# Patient Record
Sex: MALE | Race: WHITE
[De-identification: names, ages, dates, MRNs, and addresses within clinical notes are randomized per-mention and may not be internally consistent; named-entity substitution may affect disease eponyms.]

---

## 2020-07-08 LAB
BUN BLD-MCNC: 25 MG/DL (ref 7–18)
GLUCOSE SERPLBLD-MCNC: 115 MG/DL (ref 74–106)
HCT VFR BLD CALC: 48.9 % (ref 39.6–49)
LYMPHOCYTES # SPEC AUTO: 1.6 K/UL (ref 0.7–4.9)
PMV BLD: 9.2 FL (ref 7.6–11.3)
POTASSIUM SERPL-SCNC: 4.3 MMOL/L (ref 3.5–5.1)
RBC # BLD: 5.78 M/UL (ref 4.33–5.43)

## 2020-07-08 NOTE — RAD REPORT
EXAM DESCRIPTION:  RAD - Chest Pa And Lat (2 Views) - 7/8/2020 3:56 pm

 

CLINICAL HISTORY:  pre-op for surgery, patient pending arm surgery

 

COMPARISON:  None

 

TECHNIQUE:  Frontal and lateral views of the chest were obtained.

 

FINDINGS:  The lungs are clear.  A few small granulomatous calcifications are present. Heart size is 
normal and central vasculature is within normal limits.  No pleural effusion or pneumothorax seen.  N
o acute bony finding noted.  No aortic abnormality.

 

IMPRESSION:  No acute cardiopulmonary process.

## 2020-07-09 ENCOUNTER — HOSPITAL ENCOUNTER (OUTPATIENT)
Dept: HOSPITAL 97 - OR | Age: 65
Discharge: HOME | End: 2020-07-09
Attending: SPECIALIST
Payer: MEDICARE

## 2020-07-09 VITALS — OXYGEN SATURATION: 97 % | DIASTOLIC BLOOD PRESSURE: 72 MMHG | SYSTOLIC BLOOD PRESSURE: 107 MMHG

## 2020-07-09 VITALS — TEMPERATURE: 97.5 F

## 2020-07-09 DIAGNOSIS — G89.18: ICD-10-CM

## 2020-07-09 DIAGNOSIS — S66.124A: ICD-10-CM
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
## 2020-07-09 DIAGNOSIS — Z01.810: ICD-10-CM

## 2020-07-09 DIAGNOSIS — Z01.812: ICD-10-CM

## 2020-07-09 DIAGNOSIS — S56.821A: Primary | ICD-10-CM

## 2020-07-09 DIAGNOSIS — S66.122A: ICD-10-CM

## 2020-07-09 PROCEDURE — 80048 BASIC METABOLIC PNL TOTAL CA: CPT

## 2020-07-09 PROCEDURE — 25260 REPAIR FOREARM TENDON/MUSCLE: CPT

## 2020-07-09 PROCEDURE — 93005 ELECTROCARDIOGRAM TRACING: CPT

## 2020-07-09 PROCEDURE — 14020 TIS TRNFR S/A/L 10 SQ CM/<: CPT

## 2020-07-09 PROCEDURE — 85025 COMPLETE CBC W/AUTO DIFF WBC: CPT

## 2020-07-09 PROCEDURE — 0LQ70ZZ REPAIR RIGHT HAND TENDON, OPEN APPROACH: ICD-10-PCS

## 2020-07-09 PROCEDURE — 0JXD0ZZ TRANSFER RIGHT UPPER ARM SUBCUTANEOUS TISSUE AND FASCIA, OPEN APPROACH: ICD-10-PCS

## 2020-07-09 PROCEDURE — 26350 REPAIR FINGER/HAND TENDON: CPT

## 2020-07-09 PROCEDURE — 71046 X-RAY EXAM CHEST 2 VIEWS: CPT

## 2020-07-09 PROCEDURE — 82947 ASSAY GLUCOSE BLOOD QUANT: CPT

## 2020-07-09 PROCEDURE — 0LQ50ZZ REPAIR RIGHT LOWER ARM AND WRIST TENDON, OPEN APPROACH: ICD-10-PCS

## 2020-07-09 PROCEDURE — 36415 COLL VENOUS BLD VENIPUNCTURE: CPT

## 2020-07-09 NOTE — XMS REPORT
Continuity of Care Document

                             Created on:2020



Patient:MILA MERCADO

Sex:Male

:1955

External Reference #:[id number]





Demographics







                          Address                   [address] [place] [id number]

 

                          Home Phone                [phone]

 

                          Phone                     [phone]

 

                          Preferred Language        en-US

 

                          Marital Status            Unknown

 

                          [place] Affiliation     Unknown

 

                          Race                      Unknown

 

                          Ethnic Group              Unknown









Author







                          Organization              [employer]

 [employer]









Care Team Providers







                    Name                Role                Phone

 

                    [employer] Unavailable         Un

available









Problems







          Problem   Status    Onset     Classification Date      Comments  Sourc

e



                              Date                Reported            



                                                                      



                                                                      



                                                                      

 

          Atherosclerotic           03/10/2             2018            

[facility]



          heart disease of           018                                     [place]

ar



          native coronary                                                   [place]

,



          artery without                                                   [place]

[place]



          angina pectoris                                                   



                                                                      

 

          Diabetes mellitus Resolved     Problem   2020           M

H Medical



          (disorder)           018                                     Group,



                                                                      [facility] [place]



                                                                      [place],[facility]



                                                                      



                                                                      

 

          CATHLAB   Active                                     



                              018                                     [facility]



                                                                      



                                                                      



                                                                      

 

          CAD S/P   Active                                     



          PERCUTANEOUS           018                                     [facility]

st



          CORONARY ANTIOPLAST                                                   



                                                                      



                                                                      

 

          I25.10    Active                                     



                              018                                     [facility]



                                                                      



                                                                      



                                                                      

 

          Dyspnea,            2018            [facility]



          unspecified           018                                     [place]

nd



                                                                      



                                                                      



                                                                      

 

          UNSPECIFIED CHEST Active                                     

 [place]



          PAIN-786.50 /           015                                     [place]



          GASTROES                                                    



                                                                      



                                                                      

 

          UNSPECIFIED CHEST Active       Condition 2015           [facility]



          PAIN                015                                     Group



                                                                      



                                                                      



                                                                      

 

          SCREENING FOR COLON Active       Condition 2015          

  Medical



          CANCER              015                                     Group



                                                                      



                                                                      



                                                                      

 

          Colon neoplasm Active       Problem   2015 Data       S

ugar



          screening           015                           migrated  [place]



          (procedure)                                         from GE   



                                                            [facility] 



                                                            on 8/22/15. 



                                                                      



                                                                      

 

          COLONIC POLYPS, HX Active       Condition 2015           

 Medical



          OF                  015                                     Group



                                                                      



                                                                      



                                                                      

 

          BELCHING  Active       Condition 2015            Medica

l



                              015                                     Group



                                                                      



                                                                      



                                                                      

 

          Belching symptom Active       Problem   2020 Data      

 Medical



          (context-dependent           015                           migrated  G

roup,



          category)                                         from GE   [facility] [place]



                                                            [facility] [place],



                                                            on 8/22/15. VIVIANA Weinberg [place]



                                                                      [place]



                                                                      

 

          History of polyp of Active       Problem   2020 Data     

  Medical



          colon (situation)           015                           migrated  Gr

oup,



                                                            from GE   [facility] [place]



                                                            [facility],



                                                            on 8/22/15. VIVIANA Weinberg [place]



                                                                      [place]



                                                                      

 

          BODY MASS INDEX Active       Condition 2015            

Medical



          28.0-28.9, ADULT           014                                     [facility]

up



                                                                      



                                                                      



                                                                      

 

          ACTINIC KERATOSIS Active       Condition 2015           M

 Medical



                              014                                     Group



                                                                      



                                                                      



                                                                      

 

          PVD       Active       Condition 2015            Medica

l



                              014                                     Group



                                                                      



                                                                      



                                                                      

 

          DISEASE,  Active       Condition 2015            Med[facility]

l



          HYPERTENSIVE HEART,           014                                     

Group



          BENIGN, W/O HF                                                   



                                                                      



                                                                      

 

          RENAL DISEASE, Active       Condition 2015           [facility]

edical



          CHRONIC, MILD           014                                     Group



                                                                      



                                                                      



                                                                      

 

          DIABETES MELLITUS Active       Condition 2015           M

 Medical



          WITH RENAL           014                                     Group



          MANIFESTATIONS,                                                   



          TYPE II OR                                                   



          UNSPECIFIED TYPE,                                                   



          NOT STATED AS                                                   



          UNCONTROLLED                                                   



                                                                      

 

          SEBORRHEIC Active       Condition 2015            Medic

al



          KERATOSIS           014                                     Group



                                                                      



                                                                      



                                                                      

 

          Actinic keratosis Active       Problem   2020 Data      M

H Medical



          (disorder)           014                           migrated  Group,



                                                            from GE   [facility] [place]



                                                            [facility] [place],



                                                            on 5/30/15. Francoise

t,M



                                                                      H [place]



                                                                      [place]



                                                                      

 

          Chronic kidney Active       Problem   2020 Data      [facility]

edical



          disease stage 2           014                           migrated  Grou

p,



          (disorder)                                         from GE   [facility] [place]

r



                                                            [facility] [place],



                                                            on 5/30/15. Francoise

t,M



                                                                      H [place]



                                                                      [place]



                                                                      

 

          Hypertensive heart Active       Problem   2020 Data      

 Medical



          disease without           014                           migrated  Grou

p,[facility]



          congestive heart                                         from GE   OPI

D [place]



          failure (disorder)                                         [facility] 

[place],



                                                            on 5/30/15. Francoise

t,M



                                                                      H [place]



                                                                      [place]



                                                                      

 

          Peripheral vascular Active       Problem   2020 Data     

  Medical



          disease (disorder)           014                           migrated  G

roup,



                                                            from GE   [facility] [place]



                                                            [facility] [place],



                                                            on 5/30/15. Francoise

t,M



                                                                      H [place]



                                                                      [place]



                                                                      

 

          Diabetes mellitus Active       Problem   2020 Data      M

H Medical



          type 2 (disorder)           014                           migrated  Gr

oup,



                                                            from GE   [facility] [place]



                                                            [facility] [place],



                                                            on 5/30/15. Francoise amezquita,M



                                                                      H [place]



                                                                      [place]



                                                                      

 

          Senile    Active       Problem   2015 Data       [place]



          hyperkeratosis           014                           migrated  [place]



          (disorder)                                         from GE   



                                                            [facility] 



                                                            on 5/30/15. 



                                                                      



                                                                      

 

          CAD/POSITIVE STRESS Active                                     





                              014                                     [place]



                                                                      



                                                                      



                                                                      

 

          SHOULDER PAIN Active       Condition 2015            Me

dical



                              014                                     Group



                                                                      



                                                                      



                                                                      

 

          Shoulder pain Active       Problem   2020 Data       Me

dical



          (finding)           014                           migrated  Group,



                                                            from    [employer] [place]



                                                            [facility] [place],



                                                            on 5/30/15. Francoise amezquita,M



                                                                      H [place]



                                                                      [place]



                                                                      

 

          LONG-TERM (CURRENT) Active       Condition 2015          

  Medical



          USE OF OTHER           013                                     Group



          MEDICATIONS                                                   



                                                                      



                                                                      

 

          OTITIS EXTERNA Inactive     Condition 2015            M

edical



                              013                                     Group



                                                                      



                                                                      



                                                                      

 

          Long-term drug Active       Problem   2015 Data       S

ugar



          therapy (procedure)           013                           migrated  

[place]



                                                            from [employer]   



                                                            [facility] 



                                                            on 5/30/15. 



                                                                      



                                                                      

 

          Otitis externa Resolved     Problem   2015 Data       S

ugar



          (disorder)           013                           migrated  [place]



                                                            from [facility] 



                                                            on 7/18/15. 



                                                                      



                                                                      

 

          PSORIASIS Active       Condition 2015            Medica

l



                              013                                     Group



                                                                      



                                                                      



                                                                      

 

          Psoriasis Active       Problem   2020 Data       Medica

l



          (disorder)           013                           migrated  Group,[facility]



                                                            from GE   [facility] [place]



                                                            [facility] [place],



                                                            on 5/30/15. Francoise amezquita,M



                                                                      H [place]



                                                                      [place]



                                                                      

 

          BACK PAIN Active       Condition 2015            Medica

l



                              013                                     Group



                                                                      



                                                                      



                                                                      

 

          Backache (finding) Resolved     Problem   2020 Data      

 Medical



                              013                           migrated  Group,



                                                            from [employer] [place]



                                                            [facility] [place],



                                                            on 7/18/15. Francoise amezquita,M



                                                                      H [place]



                                                                      [place]



                                                                      

 

          CHEST PAIN Inactive     Condition 2015            Medic

al



                              013                                     Group



                                                                      



                                                                      



                                                                      

 

          SHOULDER  Active       Condition 2015            Medica

l



          IMPINGEMENT           013                                     Group



                                                                      



                                                                      



                                                                      

 

          Atypical chest pain Active       Problem   2020 Data     

  Medical



          (finding)           013                           migrated  Group,



                                                            from GE   [facility] [place]



                                                            [facility] [place],



                                                            on 5/30/15. Francoise amezquita,M



                                                                      H [place]



                                                                      [place]



                                                                      

 

          Impingement Active       Problem   2020 Data       Medi

s[place]



          syndrome of           013                           migrated  Group,



          shoulder region                                         from GE   [facility]

 [place]



          (disorder)                                         [facility] [place],



                                                            on 5/30/15. [place]

t,M



                                                                      H [place]



                                                                      [place]



                                                                      

 

          OBESITY   Active       Condition 2015            Medica

l



                              012                                     Group



                                                                      



                                                                      



                                                                      

 

          GERD      Active       Condition 2015            Medica

l



                              012                                     Group



                                                                      



                                                                      



                                                                      

 

          HEALTH SCREENING Inactive     Condition 2015           

 Medical



                              012                                     Group



                                                                      



                                                                      



                                                                      

 

          Gastroesophageal Active       Problem   2020 Data      

 Medical



          reflux disease           012                           migrated  Group

,



          (disorder)                                         from GE   [facility] [place]

r



                                                            [facility] [place],



                                                            on 5/30/15. VIVIANA Weinberg [place]



                                                                      [place]



                                                                      

 

          Obesity (disorder) Active       Problem   2020 Data      

 Medical



                              012                           migrated  Group,



                                                            from GE   [facility] [place]



                                                            [facility] [place],



                                                            on 5/30/15. VIVIANA Weinberg [place]



                                                                      [place]



                                                                      

 

          DEPENDENT EDEMA Inactive  10/01/2   Condition 2015            

Medical



                              012                                     Group



                                                                      



                                                                      



                                                                      

 

          CORONARY  Active       Condition 2015            [facility]

l



          ATHEROSCLEROSI           001                                     Group



                                                                      



                                                                      



                                                                      

 

          HYPERURICEMIA Active       Condition 2015           [facility]

dical



                              960                                     Group



                                                                      



                                                                      



                                                                      

 

          GOUT      Active              Condition 10/13/2014            [facility]

l



                                                                      Group



                                                                      



                                                                      

 

          HYPERTENSION Inactive            Condition 2015            Med

ical



                                                                      Group



                                                                      



                                                                      

 

          HYPERLIPIDEMIA Active              Condition 2015           [facility]

edical



                                                                      Group



                                                                      



                                                                      

 

          DIABETES MELLITUS Inactive            Condition 2015           [facility]



                                                                      Group



                                                                      



                                                                      

 

          COPD      Active              Condition 2015            [facility]

l



                                                                      Group



                                                                      



                                                                      

 

          OSTEOARTHRITIS Active              Condition 2015           [facility]

edical



                                                                      Group



                                                                      



                                                                      

 

          FH LUNG CANCER Inactive            Condition 2015           [facility]

edical



                                                                      Group



                                                                      



                                                                      

 

          FH DIABETES - DM Inactive            Condition 2015           

 Medical



                                                                      Group



                                                                      



                                                                      

 

          FH HEART DISEASE Inactive            Condition 2015           

 Medical



                                                                      Group



                                                                      



                                                                      

 

          FH STROKE Inactive            Condition 2015            [facility]

l



                                                                      Group



                                                                      



                                                                      

 

          DIABETES MELLITUS, Active              Condition 2015           

 Medical



          TYPE II, CONTROLLED                                                   

Group



          W/VASCULAR                                                   



          COMPLICATIONS                                                   



                                                                      

 

          Malignant tumor of Resolved            Problem   2020           

[facility]



          urinary bladder                                                   Grou

p,



          (disorder)                                                   [facility] [place]

r



                                                                      [place],



                                                                      [place],

[place]



                                                                      [place]



                                                                      [place]



                                                                      

 

          Chronic obstructive Active              Problem   2020 Data     

  Medical



          lung disease                                         migrated  Group,M

H



          (disorder)                                         from GE   [facility] [place]

r



                                                            [facility] [place],



                                                            on 5/30/15. VIVIANA Weinberg [place]



                                                                      [place]



                                                                      

 

          Atherosclerosis of Active              Problem   2020 Data      

 Medical



          coronary artery                                         migrated  Grou

p,[facility]



          (disorder)                                         from GE   [facility] [place]

r



                                                            [facility] [place],



                                                            on 5/30/15. [place]

t,[place]



                                                                      [place]



                                                                      [place]



                                                                      

 

          Neuropathy Active              Problem   2020 feet due   Medic

al



          (disorder)                                         diabetic  Group,



                                                                      [facility] [place]



                                                                      [place],[facility],

M



                                                                      [facility] [place]



                                                                      [place]



                                                                      

 

          Numbness of foot Active              Problem   2020           

 Medical



          (finding)                                                   Group,



                                                                      [facility] [place]



                                                                      [place],[facility],

M



                                                                      [facility] [place]



                                                                      [place]



                                                                      

 

          Peripheral Active              Problem   2020 Data       Medic

al



          circulatory                                         migrated  Group,



          disorder associated                                         from GE   

[facility] [place]



          with type II                                         [facility] [place]

H



          diabetes mellitus                                         on 5/30/15. 

[facility],



          (disorder)                                                   [facility] [place]



                                                                      [place]



                                                                      

 

          Bronchitis Resolved            Problem   2020            Medic

al



          (disorder)                                                   Group,



                                                                      [facility] [place]



                                                                      [place],[facility]



                                                                      



                                                                      

 

          Chest pain Active              Problem   2020            Medic

al



          (finding)                                                   Group,



                                                                      [facility] [place]



                                                                      [place],[facility]



                                                                      



                                                                      

 

          GOUT(Confirmed) Resolved            Problem   2020            

Medical



                                                                      Group,



                                                                      [facility] [place]



                                                                      [place],[facility]



                                                                      



                                                                      

 

          Essential Resolved            Problem   2020            [facility]

l



          hypertension                                                   Group,

H



          (disorder)                                                   [facility] [place]

r



                                                                      [place],[facility],

M



                                                                      [facility] [place]



                                                                      [place]



                                                                      

 

          Hyperuricemia Active              Problem   2020            Me

dical



          (disorder)                                                   Group,



                                                                      [facility] [place]



                                                                      [place],[facility],

M



                                                                      [facility] [place]



                                                                      [place]



                                                                      

 

          Mixed     Active              Problem   2020            Med[facility]

l



          hyperlipidemia                                                   Group

,



          (disorder)                                                   [facility] [place]

r



                                                                      [place],[facility]



                                                                      



                                                                      

 

          Myocardial Resolved            Problem   2020            Medic

al



          infarction                                                   Group,



          (disorder)                                                   [facility] [place]

r



                                                                      [place],[facility]



                                                                      



                                                                      

 

          Pneumonia Resolved            Problem   2020            [facility]

l



          (disorder)                                                   Group,



                                                                      [facility] [place]



                                                                      [place],[facility]



                                                                      



                                                                      

 

          Pulmonary emphysema Active              Problem   2020          

  Medical



          (disorder)                                                   Group,



                                                                      [facility] [place]



                                                                      [place],[facility]



                                                                      



                                                                      

 

          Essential (primary)                               2018          

  [facility]



          hypertension                                                   [place]

and



                                                                      



                                                                      

 

          Centrilobular                               2018            OP

ID



          emphysema                                                   [place]



                                                                      



                                                                      

 

          Non-ST elevation                               2018           



          (NSTEMI) myocardial                                                   

[place]



          infarction                                                   



                                                                      

 

          Type 2 diabetes                               2018           



          mellitus with                                                   [place]

est



          diabetic chronic                                                   



          kidney disease                                                   



                                                                      

 

          Type 2 diabetes                               2018           



          mellitus with                                                   [place]

est



          diabetic                                                    



          polyneuropathy                                                   



                                                                      

 

          Type 2 diabetes                               2018           



          mellitus with                                                   [place]

est



          hyperglycemia                                                   



                                                                      

 

          Acute                                   2018           



          posthemorrhagic                                                   [place]

[place]



          anemia                                                      



                                                                      

 

          Other                                   2018           



          postprocedural                                                   [facility]



          cardiac functional                                                   



          disturbances                                                   



          following cardiac                                                   



          surgery                                                     



                                                                      

 

          Other specified                               2018           



          complication of                                                   [facility]

[place]



          cardiac prosthetic                                                   



          devices, implants                                                   



          and grafts, initial                                                   



          encounter                                                   



                                                                      

 

          Obesity,                                2018           



          unspecified                                                   [place]

t



                                                                      



                                                                      

 

          Chronic kidney                               2018           



          disease, stage 2                                                   [facility]



          (mild)                                                      



                                                                      

 

          Gastro-esophageal                               2018           M

H



          reflux disease                                                   [facility]



          without esophagitis                                                   



                                                                      



                                                                      

 

          Body mass index                               2018           [facility]



          (BMI) 29.0-29.9,                                                   [place]

[place]



          adult                                                       



                                                                      

 

          Hypertensive heart                               2018           

[facility]



          and chronic kidney                                                   [place]

[place]



          disease without                                                   



          heart failure, with                                                   



          stage 1 through                                                   



          stage 4 chronic                                                   



          kidney disease, or                                                   



          unspecified chronic                                                   



          kidney disease                                                   



                                                                      

 

          Personal history of                               2018          

 



          malignant neoplasm                                                   [place]

[place]



          of bladder                                                   



                                                                      

 

          Personal history of                               2018          

 



          nicotine dependence                                                   

[facility]



                                                                      



                                                                      

 

          Personal history of                               2018          

 



          colonic polyps                                                   [place]

[place]



                                                                      



                                                                      

 

          Long term (current)                               2018          

 



          use of insulin                                                   [place]

[place]



                                                                      



                                                                      

 

          Hyperlipidemia,                               2018           



          unspecified                                                   [place]

t



                                                                      



                                                                      

 

          Chronic obstructive                               2018          

 



          pulmonary disease,                                                   [place]

[place]



          unspecified                                                   



                                                                      

 

          Gout, unspecified                               2018           M

H



                                                                      [facility]



                                                                      



                                                                      

 

          Psoriasis,                               2018           



          unspecified                                                   [place]

t



                                                                      



                                                                      

 

          acid      Active              Problem   2014           



          reflux(Confirmed)                                                   [place]

[place]



                                                                      



                                                                      

 

          diabetes  Active              Problem   2014           



          mellitus(Confirmed)                                                   

[facility]



                                                                      



                                                                      

 

          Coronary  Resolved            Problem   2015 Pt had    



          arteriosclerosis                                         bypass    [facility],



          (disorder)                                         surgery   [facility] [place]



                                                                      [place]



                                                                      

 

          Coronary artery Active              Problem   2015           



          disease(Confirmed)                                                   [place]

[place]



                                                                      [facility] [place]



                                                                      [place]



                                                                      

 

          DM (diabetes Resolved            Problem   2015           



          mellitus)(Confirmed                                                   

[facility],



          )                                                           [facility] [place]



                                                                      [place]



                                                                      

 

          GOUT(Confirmed) Active              Problem   2015           [facility],





                                                                      [place]



                                                                      [place]



                                                                      

 

           Gout (disorder) Resolved              Problem    2015 Data migr

ated from [employer] on

7/8/15.                                  [place]



                                                            Data migrated from G

E [employer] on 6/2/15. [place]



                                                                      



                                                                      

 

          GOUT(Confirmed) Resolved            Problem   2015           [facility],





                                                                      [place]



                                                                      [place]



                                                                      

 

          Hyperlipidemia Active              Problem   2015 Data       S

[place]



          (disorder)                                         [place]



                                                            from [facility]   



                                                            [facility] 



                                                            on 5/30/15. 



                                                                      



                                                                      

 

          Hypertensive Active              Problem   2015           



          disorder, systemic                                                   [place]

[place]



          arterial (disorder)                                                   

[facility] [place]



                                                                      [place]



                                                                      

 

          Osteoarthritis Active              Problem   2015 Data       [place]

[place]



          (disorder)                                         [place]  [place]



                                                            from [facility] 



                                                            on 5/30/15. 



                                                                      



                                                                      

 

          Osteoarthritis(Conf Resolved            Problem   2015          

 



          irmed)                                                      [facility],

[place]



                                                                       [place]



                                                                      [place]



                                                                      

 

          Dyspnea (finding) Active              Problem   2020           M

[facility]



                                                                      Group,[facility],

M



                                                                      [place]



                                                                      [place]



                                                                      

 

          CHEST PAIN NOS Active                                             [place]

[place]



                                                                      [place]



                                                                      

 

          ATHSCL HEART Active                                            



          DISEASE OF NATIVE                                                   [place]

[facility]



          [facility]ON[facility]                                                    



                                                                      







Medications







        Medication Details Route   Status  Patient Ordering Order   Source



                                        Instructions Provider Date    



                                                                



                                                                



                                                                

 

        Vitamin B-12 1000 1,000           Active                     Med

ical



        mcg oral tablet microgram = 1                                 2020    Gr

oup



                tab, PO,                                         



                Daily, 0                                         



                Refill(s)                                         



                                                                



                                                                

 

        Vitamin D3 PO, Daily, 0         Active                     Medic

al



                Refill(s)                                     Group



                                                                



                                                                



                                                                

 

        Insulin Glargine 20 unit,         Active                     Med

ical



        100 UNT/ML SUB-Q,                                      Group



        Injectable Bedtime, # 15                                         



        Solution mL, 3                                           



                Refill(s),                                         



                other                                           



                                                                



                                                                

 

        sitagliptin 100 MG = 1 tab, PO,         Active                    

 Medical



        Oral Tablet Daily, # 90                                 2020    Group



        [Januvia] tab, 1                                          



                Refill(s),                                         



                Pharmacy:                                         



                [facility]                                         



                                                                



                                                                

 

        spironolactone 25 = 1 tab, PO,         Active                  /  M

[facility]



        mg oral tablet BID, # 180                                 2020    Group



                tab, 1                                          



                Refill(s),                                         



                Pharmacy:                                         



                [facility]                                         



                [facility]                                         



                                                                



                                                                

 

        canagliflozin 300 = 1 tab, PO,         Active                  /  M

[facility]



        MG Oral Tablet Daily, # 90                                 2020    Group



        [Invokana] tab, 1                                          



                Refill(s),                                         



                Pharmacy:                                         



                [facility]                                         



                                                                



                                                                

 

        Fenofibrate 160 MG = 1 tab, PO,         Active                    

 Medical



        Oral Tablet Daily, # 90                                 2020    Group



                tab, 1                                          



                Refill(s),                                         



                Pharmacy:                                         



                [facility]                                         



                                                                



                                                                

 

        glimepiride 2 mg See             Active                     Medi

[place]



        oral tablet Instructions,                                     Group



                TAKE 1 TABLET                                         



                BY MOUTH TWO                                         



                TIMES DAILY ,                                         



                IN THE                                          



                MORNING AND                                         



                AT NOON, #                                         



                180 tab, 1                                         



                Refill(s),                                         



                Pharmacy:                                         



                [facility]                                         



                                                                



                                                                

 

        losartan 100 mg = 1 tab, PO,         Active                     

Medical



        oral tablet Daily, # 90                                 2020    Group



                tab, 1                                          



                Refill(s),                                         



                Pharmacy:                                         



                [facility]                                         



                                                                



                                                                

 

        Metformin = 1 tab, PO,         Active                   Medica

l



        hydrochloride 1000 BID, # 180                                 2020    Gr

oup



        MG Oral Tablet tab, 1                                          



                Refill(s),                                         



                Pharmacy:                                         



                [facility]                                         



                                                                



                                                                

 

        Metoprolol = 1 tab, PO,         Active                   Medic

al



        Tartrate 25 mg Daily, # 90                                 2020    Group



        oral tablet tab, 1                                          



                Refill(s),                                         



                Pharmacy:                                         



                [facility]                                         



                                                                



                                                                

 

        amLODIPine 5 mg = 1 tab, PO,         Active                   

Medical



        oral tablet Daily, # 90                                 2020    Group



                tab, 1                                          



                Refill(s),                                         



                Pharmacy:                                         



                [facility]                                         



                                                                



                                                                

 

        atorvastatin 40 mg = 1 tab, PO,         Active                   Medical



        oral tablet BID, # 180                                 2020    Group



                tab, 1                                          



                Refill(s),                                         



                Pharmacy:                                         



                [facility]                                         



                                                                



                                                                

 

        azithromycin 500 500 mg = 1         Active                   M

edical



        mg oral tablet tab, PO,                                 2019    Group



                Daily, X 7                                         



                day, # 7 tab,                                         



                0 Refill(s),                                         



                Pharmacy:                                         



                [facility]                                         



                [facility]                                         



                                                                



                                                                

 

        Medrol 4 mg oral = 1 pkt, PO,         Active                    

 Medical



        tablet  ONCE, as                                 2019    Group



                directed on                                         



                package                                         



                labeling, #                                         



                21 tab, 0                                         



                Refill(s),                                         



                Pharmacy:                                         



                [facility]                                         



                [facility]                                         



                                                                



                                                                

 

        Brompheniramine 5 mL, PO,         Active                     Med

ical



        Maleate 0.4 MG/ML TID, PRN                                 2019    Group



        / Dextromethorphan cough, X 8                                         



        Hydrobromide 2 day, # 120                                         



        MG/ML / mL, 0                                           



        Pseudoephedrine Refill(s),                                         



        Hydrochloride 6 Pharmacy:                                         



        MG/ML Oral [facility]                                         



        [facility]                                         



        DM]                                                     



                                                                

 

        canagliflozin 300 = 1 tab, PO,         Active                  /  M

[facility]



        MG Oral Tablet Daily, # 90                                 2019    Group



        [Invokana] tab, 1                                          



                Refill(s),                                         



                Pharmacy:                                         



                [facility]                                         



                                                                



                                                                

 

        Famotidine 20 MG 20 mg = 1         Active                   Me

dical



        Oral Tablet tab, PO, BID,                                 2019    Group



                # 180 tab, 1                                         



                Refill(s),                                         



                Pharmacy:                                         



                [facility]                                         



                                                                



                                                                

 

        Ascorbic Acid 200 1 tab, PO,         Active                   

Medical



        MG / Beta Carotene Daily, # 30                                 2019    G

roup



        1000 UNT / cuprous tab, 0                                          



        oxide 2 MG / Refill(s)                                         



        dl-alpha                                                 



        tocopheryl acetate                                                 



        60 UNT / Lutein 2                                                 



        MG / sodium                                                 



        selenate 0.055 MG                                                 



        / Zinc Oxide 40 MG                                                 



        Oral Tablet                                                 



        [Ocuvite]                                                 



                                                                

 

        icosapent ethyl 2 gm = 2 cap,         Active                  

 Medical



        1000 MG Oral PO, BID, #                                 2019    Group



        Capsule [Vascepa] 360 cap, 1                                         



                Refill(s),                                         



                Pharmacy:                                         



                [facility]                                         



                                                                



                                                                

 

        Insulin Glargine 50 unit,         Active                   Med

ical



        100 UNT/ML SUB-Q,                                  2019    Group



        Injectable Bedtime, # 15                                         



        Solution mL, 3                                           



                Refill(s),                                         



                Pharmacy:                                         



                [facility]                                         



                                                                



                                                                

 

        [id number] See             Active                   Me

dical



        UF_SHORT Instructions,                                 2019    Group



                # 90 ea,                                         



                Refill(s) 3,                                         



                USE ONE PEN                                         



                NEEDLE DAILY,                                         



                Pharmacy:                                         



                [facility]                                         



                                                                



                                                                

 

        LANCET ONE TOUCH See             Active                     Medi

sandra



        DELICA 33 G Instructions,                                 2019    Group



                # 100 ea,                                         



                Refill(s) 3,                                         



                TEST 1 TIME                                         



                DAILY,                                          



                Pharmacy:                                         



                [facility]                                         



                                                                



                                                                

 

        canagliflozin 300 = 1 tab, PO,         Active                  

H Medical



        MG Oral Tablet Daily, # 90                                 2019    Group



        [Invokana] tab,                                            



                Refill(s) 1,                                         



                Pharmacy:                                         



                [facility]                                         



                                                                



                                                                

 

        T STRIP S ONE See             Active                     Medical



        TOUCH ULTRA BLUE Instructions,                                 2019    G

roup



                # 100 strip,                                         



                Refill(s) 3,                                         



                USE 1 STRIP                                         



                DAILY,                                          



                Pharmacy:                                         



                [facility]                                         



                                                                



                                                                

 

        spironolactone 25 See             Active                     Med

ical



        mg oral tablet Instructions,                                 2019    [facility]

up



                TAKE ONE                                         



                TABLET BY                                         



                MOUTH TWICE                                         



                DAILY, # 180                                         



                tab, 1                                          



                Refill(s),                                         



                Pharmacy:                                         



                [facility]                                         



                                                                



                                                                

 

        sitagliptin 100  mg = 1         Active                    

 Medical



        Oral Tablet tab, PO,                                 2019    Group



        [Januvia] Daily, # 90                                         



                tab, 1                                          



                Refill(s),                                         



                Pharmacy:                                         



                [facility]                                         



                                                                



                                                                

 

        Ranitidine 150 MG See             Active                     Med

ical



        Oral Tablet Instructions,                                 2019    Group



                TAKE ONE                                         



                TABLET BY                                         



                MOUTH TWICE                                         



                DAILY, # 180                                         



                tab, 1                                          



                Refill(s),                                         



                Pharmacy:                                         



                [facility]                                         



                                                                



                                                                

 

        omeprazole 20 mg 20 mg = 1         Active                     Me

dical



        oral delayed cap, PO,                                 2019    Group



        release capsule Daily, # 90                                         



                cap, 1                                          



                Refill(s),                                         



                Pharmacy:                                         



                [facility]                                         



                                                                



                                                                

 

        metoprolol 25 mg = 1         Active                   Medical



        tartrate 25 mg tab, PO,                                 2019    Group



        oral tablet Daily, # 90                                         



                tab, 1                                          



                Refill(s),                                         



                Pharmacy:                                         



                [facility]                                         



                [facility]                                         



                                                                



                                                                

 

        Metformin See             Active                   Medical



        hydrochloride 1000 Instructions,                                 2019   

 Group



        MG Oral Tablet TAKE ONE                                         



                TABLET BY                                         



                MOUTH TWICE                                         



                DAILY, # 180                                         



                tab, 1                                          



                Refill(s),                                         



                Pharmacy:                                         



                [facility]                                         



                [facility]                                         



                                                                



                                                                

 

        Losartan Potassium 100 mg = 1         Active                  

 Medical



        100 MG Oral Tablet tab, PO,                                 2019    [facility]

p



        [Cozaar] Daily, # 90                                         



                tab, 1                                          



                Refill(s),                                         



                Pharmacy:                                         



                [facility]                                         



                [facility]                                         



                                                                



                                                                

 

        Insulin Glargine 40 unit,         Active                   Med

ical



        100 UNT/ML SUB-Q,                                  2019    Group



        Injectable Bedtime, # 3                                         



        Solution mL, 3                                           



                Refill(s),                                         



                Pharmacy:                                         



                [facility]                                         



                [facility]                                         



                                                                



                                                                

 

        glimepiride 2 mg 2 mg = 1 tab,         Active                  

[facility]



        oral tablet PO, BID, am                                 2019    Group



                and noon, #                                         



                180 tab, 1                                         



                Refill(s),                                         



                Pharmacy:                                         



                [facility]                                         



                [facility]                                         



                                                                



                                                                

 

        Fenofibrate 160 MG See             Active                   Me

dical



        Oral Tablet Instructions,                                 2019    Group



                TAKE ONE                                         



                TABLET BY                                         



                MOUTH ONCE                                         



                DAILY, # 90                                         



                tab, 1                                          



                Refill(s),                                         



                Pharmacy:                                         



                [facility]                                         



                [facility]                                         



                                                                



                                                                

 

        clopidogrel 75 mg 75 mg = 1         Active                   M

edical



        oral tablet tab, PO,                                 2019    Group



                Daily, # 90                                         



                tab, 3                                          



                Refill(s),                                         



                Pharmacy:                                         



                [facility]                                         



                [facility]                                         



                                                                



                                                                

 

        atorvastatin 40 mg 40 mg = 1         Active                   

Medical



        oral tablet tab, PO, BID,                                 2019    Group



                # 180 tab, 1                                         



                Refill(s),                                         



                Pharmacy:                                         



                [facility]                                         



                                                                



                                                                

 

        Amlodipine 5 MG 5 mg = 1 tab,         Active                    

 Medical



        Oral Tablet PO, Daily, #                                 2019    Group



        [Norvasc] 90 tab, 1                                         



                Refill(s),                                         



                Pharmacy:                                         



                [facility]                                         



                                                                



                                                                

 

        allopurinol 300 mg See             Active                    [facility]

dical



        oral tablet Instructions,                                 2019    Group



                TAKE ONE                                         



                TABLET BY                                         



                MOUTH ONCE                                         



                DAILY, # 90                                         



                tab, 3                                          



                Refill(s),                                         



                Pharmacy:                                         



                [facility]                                         



                [facility]                                         



                                                                



                                                                

 

        canagliflozin 100 100 mg = 1         Inactive                 

 Medical



        MG Oral Tablet tab, PO,                                 2019    Group



        [Invokana] Before                                          



                Breakfast, #                                         



                90 tab, 1                                         



                Refill(s),                                         



                Pharmacy:                                         



                [facility]                                         



                [facility]                                         



                                                                



                                                                

 

        canagliflozin 100 100 mg = 1         Inactive                 

 Medical



        MG Oral Tablet tab, PO,                                 2019    Group



        [Invokana] Before                                          



                Breakfast, #                                         



                30 tab, 0                                         



                Refill(s)                                         



                                                                



                                                                

 

        Anoro Ellipta 62.5 1 puff,         Active                   Me

dical



        mcg-25 mcg INHALER,                                 2019    Group



        inhalation powder Daily, # 1                                         



                ea, 11                                          



                Refill(s),                                         



                Pharmacy:                                         



                [facility]                                         



                [facility]                                         



                                                                



                                                                

 

        sitagliptin 100  mg = 1         No Longer                 01/15/  

[facility]



        Oral Tablet tab, PO,         Active                      Group



        [Januvia] Daily, # 90                                         



                tab, 1                                          



                Refill(s),                                         



                Pharmacy:                                         



                [facility]                                         



                [facility]                                         



                                                                



                                                                

 

        spironolactone 25 See             No Longer                 01/15/  [facility] 

[facility]



        mg oral tablet Instructions,         Active                      [facility]

up



                TAKE ONE                                         



                TABLET BY                                         



                MOUTH TWICE                                         



                DAILY, # 180                                         



                tab, 1                                          



                Refill(s),                                         



                Pharmacy:                                         



                [facility]                                         



                [facility]                                         



                                                                



                                                                

 

        Ranitidine 150 MG See             No Longer                 01/15/  [facility] 

[facility]



        Oral Tablet Instructions,         Active                      Group



                TAKE ONE                                         



                TABLET BY                                         



                MOUTH TWICE                                         



                DAILY, # 180                                         



                tab, 1                                          



                Refill(s),                                         



                Pharmacy:                                         



                [facility]                                         



                                                                



                                                                

 

        omeprazole 20 mg 20 mg = 1         No Longer                 01/15/  [facility] 

[facility]



        oral delayed cap, PO,         Active                  2019    Group



        release capsule Daily, # 90                                         



                cap, 1                                          



                Refill(s),                                         



                Pharmacy:                                         



                [facility]                                         



                [facility]                                         



                                                                



                                                                

 

        metoprolol 25 mg = 1         No Longer                 01/15/  [facility]

l



        tartrate 25 mg tab, PO,         Active                  2019    Group



        oral tablet Daily, # 90                                         



                tab, 1                                          



                Refill(s),                                         



                Pharmacy:                                         



                [facility]                                         



                [facility]                                         



                                                                



                                                                

 

        Metformin See             No Longer                 01/15/  [facility]



        hydrochloride 1000 Instructions,         Active                  2019   

 Group



        MG Oral Tablet TAKE ONE                                         



                TABLET BY                                         



                MOUTH TWICE                                         



                DAILY, # 180                                         



                tab, 1                                          



                Refill(s),                                         



                Pharmacy:                                         



                [facility]                                         



                                                                



                                                                

 

        Losartan Potassium 100 mg = 1         No Longer                 01/15/  

[facility]



        100 MG Oral Tablet tab, PO,         Active                  2019    [facility]

p



        [Cozaar] Daily, # 90                                         



                tab, 1                                          



                Refill(s),                                         



                Pharmacy:                                         



                [facility]                                         



                                                                



                                                                

 

        glimepiride 2 mg 2 mg = 1 tab,         No Longer                 01/15/ 

 [facility]



        oral tablet PO, BID, am         Active                  2019    Group



                and noon, #                                         



                180 tab, 1                                         



                Refill(s),                                         



                Pharmacy:                                         



                [facility]                                         



                                                                



                                                                

 

        Fenofibrate 160 MG See             No Longer                 01/15/  [facility] 

[facility]



        Oral Tablet Instructions,         Active                  2019    Group



                TAKE ONE                                         



                TABLET BY                                         



                MOUTH ONCE                                         



                DAILY, # 90                                         



                tab, 1                                          



                Refill(s),                                         



                Pharmacy:                                         



                [facility]                                         



                                                                



                                                                

 

        atorvastatin 40 mg 40 mg = 1         No Longer                 01/15/  [facility]

[facility]



        oral tablet tab, PO, BID,         Active                  2019    Group



                # 180 tab, 1                                         



                Refill(s),                                         



                Pharmacy:                                         



                [facility]                                         



                                                                



                                                                

 

        Amlodipine 5 MG 5 mg = 1 tab,         No Longer                 01/15/  

[facility]



        Oral Tablet PO, Daily, #         Active                  2019    Group



        [Norvasc] 90 tab, 1                                         



                Refill(s),                                         



                Pharmacy:                                         



                [facility]                                         



                                                                



                                                                

 

        allopurinol 300 mg See             No Longer                 01/15/  [facility] 

[facility]



        oral tablet Instructions,         Active                  2019    Group



                TAKE ONE                                         



                TABLET BY                                         



                MOUTH ONCE                                         



                DAILY, # 90                                         



                tab, 3                                          



                Refill(s),                                         



                Pharmacy:                                         



                [facility]                                         



                                                                



                                                                

 

        Linagliptin 5 MG 5 mg = 1 tab,         Inactive                 01/15/  

[facility]



        Oral Tablet PO, Daily, #                                 2019    Group



        [Tradjenta] 90 tab, 1                                         



                Refill(s),                                         



                Pharmacy:                                         



                [facility]                                         



                                                                



                                                                

 

        Insulin Glargine 40 unit,         No Longer                 01/15/  [facility]

[facility]



        100 UNT/ML SUB-Q,          Active                  2019    Group



        Injectable Bedtime, # 3                                         



        Solution mL, 3                                           



                Refill(s),                                         



                Pharmacy:                                         



                [facility]                                         



                [facility]                                         



                                                                



                                                                

 

        BD Ultra-Fine Ginger 1  MISC,         Active                  01/15/  [facility]

[facility]



        Insulin Pen Daily, # 100                                 2019    Group



        [place] 32G ea, 3                                           



        4mm=5/32 inch Refill(s),                                         



                Pharmacy:                                         



                [facility]                                         



                [facility]                                         



                                                                



                                                                

 

        OneTouch Ultra2 1 ea, MISC,         Active                   M

edical



        Blood Glucose ONCE, Use as                                 2019    Group



        Meter   directed to                                         



                check blood                                         



                sugar.                                          



                E11.9, # 1                                         



                ea, Insulin                                         



                dependent,                                         



                Does not use                                         



                insulin pump,                                         



                Last DM eval                                         



                date                                            



                18, 0                                         



                Refill(s)                                         



                                                                



                                                                

 

        Aspirin 81  mg = 2         Active                  11/15/  [facility]

[facility]



        Enteric Coated tab, PO,                                 2018    Group



        Tablet  Daily, # 60                                         



                tab, 4                                          



                Refill(s),                                         



                other                                           



                                                                



                                                                

 

        clopidogrel 75 mg 75 mg = 1         Active                  11/15/   [facility]

[facility]



        oral tablet tab, PO,                                 2018    Group



                Daily, # 90                                         



                tab, 3                                          



                Refill(s),                                         



                Pharmacy:                                         



                [facility]                                         



                [facility]                                         



                                                                



                                                                

 

        predniSONE 20 mg 40 mg = 2         No Longer                 09/10/  [facility] 

[facility]



        oral tablet tab, PO,         Active                  2018    Group



                Daily, X 5                                         



                day, # 10                                         



                tab, 0                                          



                Refill(s),                                         



                Pharmacy:                                         



                [facility]                                         



                [facility]                                         



                                                                



                                                                

 

        Amoxicillin 875  mg = 1         No Longer                 09/10/  

 Medical



        / Clavulanate 125 tab, PO,         Active                  2018    Group



        MG Oral Tablet Q12H, X 7                                         



        [Augmentin 875-mg] day, # 14                                         



                tab, 0                                          



                Refill(s),                                         



                Pharmacy:                                         



                [facility]                                         



                [facility]                                         



                                                                



                                                                

 

        ticagrelor 90 mg 90 mg = 1         No Longer                  

Medical



        oral tablet tab, PO,         Active                  2018    Group



                Q12H, # 180                                         



                tab, 4                                          



                Refill(s),                                         



                Pharmacy:                                         



                [facility]                                         



                [facility]                                         



                                                                



                                                                

 

        spironolactone 25 See             No Longer                   [facility]

edical



        mg oral tablet Instructions,         Active                      [facility]

up



                TAKE ONE                                         



                TABLET BY                                         



                MOUTH TWICE                                         



                DAILY, # 180                                         



                tab, 1                                          



                Refill(s),                                         



                Pharmacy:                                         



                [facility]                                         



                [facility]                                         



                                                                



                                                                

 

        sitagliptin 100 MG See             No Longer                    

Medical



        Oral Tablet Instructions,         Active                      Group



        [Januvia] TAKE ONE                                         



                TABLET BY                                         



                MOUTH ONCE                                         



                DAILY, # 90                                         



                tab, 1                                          



                Refill(s),                                         



                Pharmacy:                                         



                [facility]                                         



                [facility]                                         



                                                                



                                                                

 

        Ranitidine 150 MG See             No Longer                   [facility]

edical



        Oral Tablet Instructions,         Active                      Group



                TAKE ONE                                         



                TABLET BY                                         



                MOUTH TWICE                                         



                DAILY, # 180                                         



                tab, 1                                          



                Refill(s),                                         



                Pharmacy:                                         



                [facility]                                         



                [facility]                                         



                                                                



                                                                

 

        omeprazole 20 mg 20 mg = 1         No Longer                    

Medical



        oral delayed cap, PO,         Active                      Group



        release capsule Daily, # 90                                         



                cap, 1                                          



                Refill(s),                                         



                Pharmacy:                                         



                [facility]                                         



                [facility]                                         



                                                                



                                                                

 

        60 ACTUAT 2 puff,         Active                     Medical



        olodaterol 0.0025 INHALATION,                                 2018    Gr

oup



        MG/ACTUAT / Q24H, # 1                                         



        tiotropium 0.0025 box, 6                                          



        MG/ACTUAT Metered Refill(s),                                         



        Dose Inhaler Pharmacy:                                         



        [[facility]] [facility]                                         



                [facility]                                         



                                                                



                                                                

 

        metoprolol 25 mg = 1         No Longer                  [facility]

l



        tartrate 25 mg tab, PO,         Active                  2018    Group



        oral tablet Daily, # 90                                         



                tab, 1                                          



                Refill(s),                                         



                Pharmacy:                                         



                [facility]                                         



                [facility]                                         



                                                                



                                                                

 

        Metformin See             No Longer                  Medical



        hydrochloride 1000 Instructions,         Active                  2018   

 Group



        MG Oral Tablet TAKE ONE                                         



                TABLET BY                                         



                MOUTH TWICE                                         



                DAILY, # 180                                         



                tab, 1                                          



                Refill(s),                                         



                Pharmacy:                                         



                [facility]                                         



                [facility]                                         



                                                                



                                                                

 

        Losartan Potassium 100 mg = 1         No Longer                  Medical



        100 MG Oral Tablet tab, PO,         Active                  2018    [facility]

p



        [Cozaar] Daily, # 90                                         



                tab, 1                                          



                Refill(s),                                         



                Pharmacy:                                         



                [facility]                                         



                [facility]                                         



                                                                



                                                                

 

        3 ML Insulin 40 unit,         No Longer                  Medic

al



        Glargine 100 SUB-Q,          Active                  2018    Group



        UNT/ML Prefilled Bedtime, # 15                                         



        Syringe [Lantus] mL, 3                                           



                Refill(s),                                         



                Pharmacy:                                         



                [facility]                                         



                [facility]                                         



                                                                



                                                                

 

        glimepiride 2 mg 2 mg = 1 tab,         No Longer                  Medical



        oral tablet PO, BID, am         Active                  2018    Group



                and noon, #                                         



                180 tab, 1                                         



                Refill(s),                                         



                Pharmacy:                                         



                [facility]                                         



                [facility]                                         



                                                                



                                                                

 

        Fenofibrate 160 MG See             No Longer                  

Medical



        Oral Tablet Instructions,         Active                  2018    Group



                TAKE ONE                                         



                TABLET BY                                         



                MOUTH ONCE                                         



                DAILY, # 90                                         



                tab, 1                                          



                Refill(s),                                         



                Pharmacy:                                         



                [facility]                                         



                [facility]                                         



                                                                



                                                                

 

        canagliflozin 300 300 mg = 1         No Longer                 08/30/  M

H Medical



        MG Oral Tablet tab, PO,         Active                  2018    Group



        [Invokana] Daily, # 90                                         



                tab, 1                                          



                Refill(s),                                         



                Pharmacy:                                         



                [facility]                                         



                [facility]                                         



                                                                



                                                                

 

        atorvastatin 40 mg 40 mg = 1         No Longer                 08/30/  M

H Medical



        oral tablet tab, PO, BID,         Active                  2018    Group



                # 180 tab, 1                                         



                Refill(s),                                         



                Pharmacy:                                         



                [facility]                                         



                                                                



                                                                

 

        Amlodipine 5 MG 5 mg = 1 tab,         No Longer                  Medical



        Oral Tablet PO, Daily, #         Active                  2018    Group



        [Norvasc] 90 tab, 1                                         



                Refill(s),                                         



                Pharmacy:                                         



                [facility]                                         



                                                                



                                                                

 

        allopurinol 300 mg See             No Longer                  

Medical



        oral tablet Instructions,         Active                  2018    Group



                TAKE ONE                                         



                TABLET BY                                         



                MOUTH ONCE                                         



                DAILY, # 90                                         



                tab, 3                                          



                Refill(s),                                         



                Pharmacy:                                         



                [facility]                                         



                                                                



                                                                

 

        cefdinir 300  mg = 1         No Longer                  

Medical



        Oral Capsule cap, PO,         Active                  2018    Group



                Q12H, X 10                                         



                day, # 20                                         



                cap, 0                                          



                Refill(s),                                         



                Pharmacy:                                         



                [facility]                                         



                [facility]                                         



                                                                



                                                                

 

        BD Ultra-Fine 1 , MISC,         Active                   Med

ical



        Short Insulin Pen Daily, # 100                                 2018    G

roup



        Needles 31G ea, 3                                           



        8mm=5/16 inch Refill(s)                                         



                                                                



                                                                

 

        OneTouch Ultra2 1 ea, MISC,         No Longer                 

 Medical



        Blood Glucose ONCE, Use as         Active                  2018    Group



        Meter   directed to                                         



                check blood                                         



                sugar.                                          



                E11.9, # 1                                         



                ea, Insulin                                         



                dependent,                                         



                Does not use                                         



                insulin pump,                                         



                Last DM eval                                         



                date                                            



                18, 0                                         



                Refill(s)                                         



                                                                



                                                                

 

        Unilet Comfort 1 ea, MISC,         Active                   Me

dical



        Touch 30G Super Daily, E11.2018    Gr

oup



        Thin Lancets # 100 ea,                                         



                Insulin                                         



                dependent,                                         



                Does not use                                         



                insulin pump,                                         



                Last DM eval                                         



                date                                            



                18, 3                                         



                Refill(s)                                         



                                                                



                                                                

 

        OneTouch Ultra 1 ea, MISC,         Active                   Me

dical



        Blue Blood Glucose Daily, E11.2018   

 Group



        Test Strip # 100 ea,                                         



                Insulin                                         



                dependent,                                         



                Does not use                                         



                insulin pump,                                         



                Last DM eval                                         



                date                                            



                18, 3                                         



                Refill(s)                                         



                                                                



                                                                

 

        3 ML Insulin 30 unit,         No Longer                  Medic

al



        Glargine 100 SUB-Q,          Active                  2018    Group



        UNT/ML Prefilled Bedtime, # 15                                         



        Syringe [Lantus] mL, 3                                           



                Refill(s),                                         



                Pharmacy:                                         



                [facility]                                         



                                                                



                                                                

 

        sitagliptin 100 MG See             No Longer                  

Medical



        Oral Tablet Instructions,         Active                  2018    Group



        [Januvia] TAKE ONE                                         



                TABLET BY                                         



                MOUTH ONCE                                         



                DAILY, # 90                                         



                tab, 1                                          



                Refill(s),                                         



                Pharmacy:                                         



                [facility]                                         



                                                                



                                                                

 

        canagliflozin 300 300 mg = 1         No Longer                 /  

H Medical



        MG Oral Tablet tab, PO,         Active                  2018    Group



        [Invokana] Daily, # 90                                         



                tab, 1                                          



                Refill(s),                                         



                Pharmacy:                                         



                [facility]                                         



                                                                



                                                                

 

        Fenofibrate 160 MG See             No Longer                  

Medical



        Oral Tablet Instructions,         Active                  2018    Group



                TAKE ONE                                         



                TABLET BY                                         



                MOUTH ONCE                                         



                DAILY, # 90                                         



                tab, 1                                          



                Refill(s),                                         



                Pharmacy:                                         



                [facility]                                         



                                                                



                                                                

 

        Amlodipine 5 MG 5 mg = 1 tab,         No Longer                   

 Medical



        Oral Tablet PO, Daily, #         Active                  2018    Group



        [Norvasc] 90 tab, 1                                         



                Refill(s),                                         



                Pharmacy:                                         



                [facility]                                         



                [facility]                                         



                                                                



                                                                

 

        Insulin Glargine 30 unit,         Inactive                    Me

dical



        100 UNT/ML SUB-Q, Daily,                                 2018    Group



        Injectable # 15 mL, 1                                         



        Solution Refill(s),                                         



                Pharmacy:                                         



                [facility]                                         



                [facility]                                         



                                                                



                                                                

 

        Insulin Glargine 46 unit,         Active                     Med

ical



        100 UNT/ML SUB-Q, Q12H,                                 2018    Group



        Injectable # 15 mL, 1                                         



        Solution Refill(s),                                         



                Pharmacy:                                         



                [facility]                                         



                [facility]                                         



                                                                



                                                                

 

        Nitroglycerin 0.4 0.4 mg = 1         Active                     

Medical



        MG Sublingual tab, SL,                                     Group



        Tablet  Q5Min, PRN                                         



                Chest Pain,                                         



                not to exceed                                         



                3 doses/15                                         



                min--if pain                                         



                persists,                                         



                seek medical                                         



                attention, #                                         



                25 tab, 3                                         



                Refill(s),                                         



                Pharmacy:                                         



                [facility]                                         



                [facility]                                         



                                                                



                                                                

 

        Nitroglycerin 0.4 0.4 mg = 1         Inactive                   

 Medical



        MG Sublingual tab, SL,                                     Group



        Tablet  Q5Min, PRN                                         



                Chest Pain,                                         



                Give up to 3                                         



                doses. Call                                         



                911 if pain                                         



                persists.                                         



                                                                



                                                                

 

        metoprolol 25 mg = 1         Active                     Medical



        tartrate 25 mg tab, PO,                                 2018    Group



        oral tablet Daily, # 90                                         



                tab, 1                                          



                Refill(s),                                         



                Pharmacy:                                         



                [facility]                                         



                [facility]                                         



                                                                



                                                                

 

        atorvastatin 40 mg 40 mg = 1         Active                     

Medical



        oral tablet tab, PO, BID,                                 2018    Group



                # 180 tab, 1                                         



                Refill(s),                                         



                Pharmacy:                                         



                [facility]                                         



                [facility]                                         



                                                                



                                                                

 

        spironolactone 25 See             Active                     Med

ical



        mg oral tablet Instructions,                                 2018    [facility]

up



                TAKE ONE                                         



                TABLET BY                                         



                MOUTH TWICE                                         



                DAILY, # 180                                         



                tab, 1                                          



                Refill(s),                                         



                Pharmacy:                                         



                [facility]                                         



                [facility]                                         



                                                                



                                                                

 

        Losartan Potassium 100 mg = 1         Active                    

 Medical



        100 MG Oral Tablet tab, PO,                                 2018    [facility]

p



        [Cozaar] Daily, # 90                                         



                tab, 1                                          



                Refill(s),                                         



                Pharmacy:                                         



                [facility]                                         



                [facility]                                         



                                                                



                                                                

 

        Fenofibrate 160 MG See             Active                  03/ Me

dical



        Oral Tablet Instructions,                                 2018    Group



                TAKE ONE                                         



                TABLET BY                                         



                MOUTH ONCE                                         



                DAILY, # 90                                         



                tab, 1                                          



                Refill(s),                                         



                Pharmacy:                                         



                [facility]                                         



                [facility]                                         



                                                                



                                                                

 

        canagliflozin 300 300 mg = 1         Active                     

Medical



        MG Oral Tablet tab, PO,                                 2018    [facility]



        [Invokana] Daily, # 90                                         



                tab, 1                                          



                Refill(s),                                         



                Pharmacy:                                         



                [facility]                                         



                [facility]                                         



                                                                



                                                                

 

        atorvastatin 40 mg See             No Longer                    

Medical



        oral tablet Instructions,         Active                      Group



                TAKE ONE                                         



                TABLET BY                                         



                MOUTH ONCE                                         



                DAILY, # 90                                         



                tab, 1                                          



                Refill(s),                                         



                Pharmacy:                                         



                [facility]                                         



                [facility]                                         



                                                                



                                                                

 

        allopurinol 300 mg See             Active                    [facility]

dical



        oral tablet Instructions,                                 2018    Group



                TAKE ONE                                         



                TABLET BY                                         



                MOUTH ONCE                                         



                DAILY, # 90                                         



                tab, 3                                          



                Refill(s),                                         



                Pharmacy:                                         



                [facility]                                         



                [facility]                                         



                                                                



                                                                

 

        glimepiride 2 mg 2 mg = 1 tab,         No Longer                  

  Medical



        oral tablet PO, BID, am         Active                      Group



                and noon, X                                         



                90 day, # 180                                         



                tab, 1                                          



                Refill(s),                                         



                Pharmacy:                                         



                [facility]                                         



                [facility]                                         



                                                                



                                                                

 

        ticagrelor 90 mg 90 mg = 1         Active                   Me

dical



        oral tablet tab, PO,                                 2018    Group



                Q12H, # 180                                         



                tab, 4                                          



                Refill(s),                                         



                Pharmacy:                                         



                [facility]                                         



                [facility]                                         



                                                                



                                                                

 

        insulin glargine Notes: (Same         No Longer                 



                as: Lantus)         Active                      [facility]



                Do not hold                                         



                insulin                                         



                without                                         



                contacting                                         



                prescriber                                         



                WASTE: F/P -                                         



                Black; E -                                         



                Municipal                                         



                Trash Bin                                         



                "single                                         



                patient use                                         



                only"                                           



                                                                



                                                                

 

        Insulin Glargine 46 unit,         No Longer                 



        100 UNT/ML SUB-Q, Daily,         Active                      [facility]



        Injectable 0 Refill(s)                                         



        Solution                                                 



                                                                

 

        Amlodipine 5 MG 5 mg = 1 tab,         Active                  



        Oral Tablet PO, Daily, #                                 2018    [facility]



        [Norvasc] 90 tab, 0                                         



                Refill(s),                                         



                Pharmacy:                                         



                [facility]                                         



                [facility]                                         



                                                                



                                                                

 

        metoprolol 12.5 mg = 0.5         No Longer                   



        tartrate 25 mg tab, PO, Q8H,         Active                      [facility]



        oral tablet # 90 tab, 0                                         



                Refill(s),                                         



                Pharmacy:                                         



                [facility]                                         



                [facility]                                         



                                                                



                                                                

 

        ticagrelor 90 mg 90 mg = 1         No Longer                 



        oral tablet tab, PO,         Active                      [facility]



                Q12H, # 60                                         



                tab, 0                                          



                Refill(s),                                         



                Pharmacy:                                         



                [facility]                                         



                [facility]                                         



                                                                



                                                                

 

        Insulin Glargine 46 unit,         Active                  



        100 UNT/ML SUB-Q, Q12H,                                     [facility]

t



        Injectable # 15 mL, 0                                         



        Solution Refill(s),                                         



                Pharmacy:                                         



                [facility]                                         



                [facility]                                         



                                                                



                                                                

 

        Insulin Lispro Notes: (Same         Inactive                 



                as: Humalog )                                     [facility]



                Roll in palms                                         



                of hands                                         



                gently;  Do                                         



                not shake                                         



                `vigorously.                                         



                "Single                                         



                Patient Use                                         



                Only "                                          



                WASTE: F/P -                                         



                Black; E -                                         



                Municipal                                         



                Trash Bin                                         



                Stable for 28                                         



                days at room                                         



                temperature.                                         



                Expires in                                         



                _____ days                                         



                from                                            



                _____________                                         



                _Date                                           



                                                                



                                                                

 

        Glucagon 1 mg, Route:         Inactive                 



                IM, Drug                                     [facility]



                form:                                           



                PDR/INJ, PRN,                                         



                Dosing Weight                                         



                95.6, kg, PRN                                         



                Blood Glucose                                         



                Results,                                         



                Start date:                                         



                18                                         



                10:09:00 CST,                                         



                Duration: 30                                         



                day, Stop                                         



                date:                                           



                18                                         



                11:08:00 CDT                                         



                                                                



                                                                

 

        Dextrose 50% 25 gm, 50 mL,         Inactive                 



        Syringe Route: IVP,                                     [facility]



                Drug Form:                                         



                INJ, Dosing                                         



                Weight 95.6,                                         



                kg, PRN, PRN                                         



                Blood Glucose                                         



                Results,                                         



                Start date:                                         



                18                                         



                10:09:00 CST,                                         



                Duration: 30                                         



                day, Stop                                         



                date:                                           



                18                                         



                11:08:00 CDT                                         



                                                                



                                                                

 

        Ticagrelor Notes: (Same         Inactive                 



                as: Brilinta)                                     [facility]



                                                                



                                                                



                                                                

 

        Dextrose 50% 12.5 gm, 25         No Longer                   



        Syringe mL, Route:         Active                      [facility]



                IVP, Drug                                         



                Form: INJ,                                         



                Dosing Weight                                         



                95.6, kg,                                         



                PRN, PRN                                         



                Blood Glucose                                         



                Results,                                         



                Start date:                                         



                18                                         



                23:29:00 CST,                                         



                Duration: 30                                         



                day, Stop                                         



                date:                                           



                18                                         



                0:28:00 CDT                                         



                                                                



                                                                

 

        Glucagon 1 mg, Route:         No Longer                   



                IM, Drug         Active                      [facility]



                form:                                           



                PDR/INJ, PRN,                                         



                Dosing Weight                                         



                95.6, kg, PRN                                         



                Blood Glucose                                         



                Results,                                         



                Start date:                                         



                18                                         



                23:29:00 CST,                                         



                Duration: 30                                         



                day, Stop                                         



                date:                                           



                18                                         



                0:28:00 CDT                                         



                                                                



                                                                

 

        Insulin Lispro Notes: (Same         No Longer                 



                as: Humalog )         Active                  2018    [facility]



                Roll in palms                                         



                of hands                                         



                gently;  Do                                         



                not shake                                         



                `vigorously.                                         



                "Single                                         



                Patient Use                                         



                Only "                                          



                WASTE: F/P -                                         



                Black; E -                                         



                Municipal                                         



                Trash Bin                                         



                Stable for 28                                         



                days at room                                         



                temperature.                                         



                Expires in                                         



                _____ days                                         



                from                                            



                _____________                                         



                _Date                                           



                                                                



                                                                

 

        Insulin Glargine Notes: (Same         Inactive                   

H



        100 UNT/ML as: Lantus)                                     [facility]



        Injectable Do not hold                                         



        Solution [Lantus] insulin                                         



                without                                         



                contacting                                         



                prescriber                                         



                WASTE: F/P -                                         



                Black; E -                                         



                Municipal                                         



                Trash Bin                                         



                "single                                         



                patient use                                         



                only"                                           



                                                                



                                                                

 

        insulin glargine Notes: (Same         No Longer                 



                as: Lantus)         Active                      [facility]



                Do not hold                                         



                insulin                                         



                without                                         



                contacting                                         



                prescriber                                         



                WASTE: F/P -                                         



                Black; E -                                         



                Municipal                                         



                Trash Bin                                         



                "single                                         



                patient use                                         



                only"                                           



                                                                



                                                                

 

        Insulin Glargine Route: SUB-Q,         Inactive                   





        100 UNT/ML Q12H, Dosing                                     [facility]

t



        Injectable Weight 95.6,                                         



        Solution [Lantus] kg, Priority:                                         



                NOW, Start                                         



                date:                                           



                18                                         



                10:23:00 CST,                                         



                Duration: 30                                         



                day, Stop                                         



                date:                                           



                18                                         



                9:00:00 CDT                                         



                                                                



                                                                

 

        Flonase 0.05 Notes: (Same         No Longer                 



        mg/inh nasal spray as: Flonase)         Active                      

[facility]



                                                                



                                                                



                                                                

 

        Fluticasone Notes: (Same         Inactive                 



        propionate 0.05 as: Flonase)                                     [facility]

[facility]



        MG/ACTUAT Metered                                                 



        Dose Nasal Spray                                                 



        [Flonase]                                                 



                                                                

 

        metoprolol Notes: (Same         No Longer                 



        tartrate as:             Active                  2018    [facility]



                Lopressor)                                         



                                                                



                                                                

 

        Famotidine Notes: (Same         No Longer                 



                as: Pepcid)         Active                      [facility]



                                                                



                                                                



                                                                

 

        Maalox Advanced Notes:          No Longer                 



        Regular Strength (aluminum         Active                      [facility]



        SUSP    hydroxide-mag                                         



                nesium                                          



                hyd-simethico                                         



                ne                                              



                [id number]/                                         



                5ml 30 ml ud                                         



                [facility])                                            



                                                                

 

        Simethicone Notes: (Same         No Longer                 



                as: Mylicon)         Active                      [facility]



                                                                



                                                                



                                                                

 

        metoprolol Notes: (Same         Inactive                 



        tartrate as:                                         [facility]



                Lopressor)                                         



                                                                



                                                                

 

        Glimepiride 2mg Glimepiride         No Longer                 



        (home med) 2mg (home         Active                      [facility]



                med), 2 tab,                                         



                Drug form:                                         



                MISC, Route:                                         



                PO, Daily,                                         



                18                                         



                17:00:00 CST,                                         



                Duration: 30                                         



                day, Stop                                         



                date:                                           



                18                                         



                9:00:00 CDT                                         



                                                                



                                                                

 

        Stiolto Respimat Stiolto         No Longer                 



        2.5 mcg-2.5 mcg Respimat 2.5         Active                      [facility]

[facility]



        inhalation aerosol mcg-2.5 mcg                                         



                inhalation                                         



                aerosol, 2                                         



                puff, Route:                                         



                INHALATION,                                         



                Daily,                                          



                18                                         



                17:00:00 CST,                                         



                Duration: 30                                         



                day, Stop                                         



                date:                                           



                18                                         



                17:00:00 CDT,                                         



                Patient's Own                                         



                Meds                                            



                                                                

 

        Protonix Notes: Tablet         No Longer                 



                should not be         Active                      [facility]



                chewed or                                         



                crushed.                                         



                (Same as:                                         



                Protonix)                                         



                                                                



                                                                

 

        heparin Notes:          No Longer                 



                porcine         Active                      [facility]



                heparin                                         



                                                                



                                                                

 

        Bisoprolol Notes: (Same         Inactive                 



                As: Zebeta)                                     [facility]



                                                                



                                                                



                                                                

 

        insulin glargine Notes: (Same         Inactive                 /  

H



                as: Lantus)                                     [facility]



                Do not hold                                         



                insulin                                         



                without                                         



                contacting                                         



                prescriber                                         



                WASTE: F/P -                                         



                Black; E -                                         



                Municipal                                         



                Trash Bin                                         



                "single                                         



                patient use                                         



                only"                                           



                                                                



                                                                

 

        Azelastine spray Azelastine         No Longer                 



        0.1%    spray 0.1%, 2         Active                      [facility]



                sprays, Drug                                         



                form: MISC,                                         



                Route: NASAL,                                         



                Daily, PRN                                         



                Allergic                                         



                reaction,                                         



                18                                         



                10:51:00 CST,                                         



                Duration: 30                                         



                day, Stop                                         



                date:                                           



                18                                         



                10:50:00 CDT                                         



                                                                



                                                                

 

        Insulin regular   60 units)         No Longer                 



        100 unit + Sodium WASTE: F/P -         Active                      [facility]

[facility]



        Chloride 0.9% IV Black; E -                                         



        99 mL   Municipal                                         



                Trash Bin                                         



                Stable for 28                                         



                days at room                                         



                temperature                                         



                Expires in                                         



                ______ days                                         



                from                                            



                _____________                                         



                _Date                                           



                                                                



                                                                

 

        Insulin Glargine 10 unit,         Inactive                 



        100 UNT/ML Route: SUB-Q,                                 2018    [facility]

st



        Injectable ONCE, Dosing                                         



        Solution [Lantus] Weight 95.6,                                         



                kg, Priority:                                         



                NOW, Start                                         



                date:                                           



                18                                         



                9:17:00 CST,                                         



                Stop date:                                         



                18                                         



                9:17:00 CST                                         



                                                                



                                                                

 

        Fluticasone Notes: (Same         No Longer                 



        propionate 0.05 as: Flonase)         Active                      [facility]

[facility]



        MG/ACTUAT Metered                                                 



        Dose Nasal Spray                                                 



        [Flonase]                                                 



                                                                

 

        Nasacort Allergy Nasacort         No Longer                 



        24HR nasal spray Allergy 24HR         Active                      [facility]



                nasal spray,                                         



                2 spray,                                         



                Route: NASAL,                                         



                Daily,                                          



                18                                         



                9:00:00 CST,                                         



                Duration: 30                                         



                day, Stop                                         



                date:                                           



                18                                         



                9:00:00 CDT,                                         



                Patient's Own                                         



                Meds                                            



                                                                

 

        Aspirin 81 MG Notes: Do not         No Longer                 



        Enteric Coated crush or         Active                      [facility]

t



        Tablet  chew. (Same                                         



                As: Ecotrin)                                         



                                                                



                                                                

 

        Ocuvite Notes: (Same         No Longer                   



        PreserVision as:Thera-M,         Active                      [facility]

st



                Theragran-M)                                         



                WASTE: F/P -                                         



                Black; E -                                         



                [employer]                                         



                Trash Bin                                         



                Give with                                         



                food.                                           



                                                                



                                                                

 

        Cozaar  Notes: (Same         No Longer                 



                as: Cozaar)         Active                      [facility]



                                                                



                                                                



                                                                

 

        Glyburide 2 mg, Route:         Inactive                 



                PO, BID,                                     [facility]



                Dosing Weight                                         



                95.455, kg,                                         



                Start date:                                         



                18                                         



                9:00:00 CST,                                         



                Duration: 30                                         



                day, Stop                                         



                date:                                           



                18                                         



                17:00:00 CDT                                         



                                                                



                                                                

 

        Allopurinol Notes: (Same         No Longer                 



                as: Zyloprim)         Active                      [facility]



                                                                



                                                                



                                                                

 

        Fenofibrate 160 MG Notes:          Inactive                 



        Oral Tablet Non-Formulary                                 2018    [facility]

est



                Drug  (Same                                         



                as: Tricor)                                         



                                                                



                                                                

 

        Januvia Notes: (Same         No Longer                 



                as: Januvia)         Active                  2018    [facility]



                pharmacy                                         



                re-entry for                                         



                product                                         



                selection                                         



                                                                



                                                                

 

        Spironolactone Notes: (Same         No Longer                 



                As:             Active                  2018    [facility]



                Aldactone)                                         



                                                                



                                                                

 

        Invokana 300 mg Invokana 300         No Longer                 

H



        oral tablet mg oral         Active                      [facility]



                tablet, 300                                         



                mg, Route:                                         



                PO, Daily,                                         



                18                                         



                9:00:00 CST,                                         



                Duration: 30                                         



                day, Stop                                         



                date:                                           



                18                                         



                9:00:00 CDT,                                         



                Patient's Own                                         



                Meds                                            



                                                                

 

        Azelastine 274             Inactive                 



        hydrochloride microgram, 2                                     [facility]



        0.137 MG/ACTUAT spray, Route:                                         



        Metered Dose Nasal NASAL, Drug                                         



        Spray   Form: SPRY,                                         



                Dosing Weight                                         



                95.455, kg,                                         



                BID, Start                                         



                date:                                           



                18                                         



                9:00:00 CST,                                         



                Duration: 30                                         



                day, Stop                                         



                date:                                           



                18                                         



                17:00:00 CDT                                         



                                                                



                                                                

 

        atorvastatin Notes: (Same         No Longer                 



                as: Lipitor)         Active                      [facility]



                                                                



                                                                



                                                                

 

        Januvia 100 mg Januvia 100         No Longer                 



        oral tablet mg oral         Active                      [facility]



                tablet, See                                         



                Instructions,                                         



                Route: PO,                                         



                Daily,                                          



                18                                         



                9:00:00 CST,                                         



                Duration: 30                                         



                day, Stop                                         



                date:                                           



                18                                         



                9:00:00 CDT,                                         



                Patient's Own                                         



                Meds                                            



                                                                

 

        fenofibrate Notes: (Same         No Longer                 



                as: Tricor)         Active                      [facility]



                                                                



                                                                



                                                                

 

        Ranitidine 150 MG 1 tab, Route:         No Longer                 



        Oral Tablet PO, Drug         Active                      [facility]



                form: TAB,                                         



                BID, Dosing                                         



                Weight                                          



                95.455, kg,                                         



                Start date:                                         



                18                                         



                9:00:00 CST,                                         



                Duration: 30                                         



                day, Stop                                         



                date:                                           



                18                                         



                17:00:00 CDT                                         



                                                                



                                                                

 

        Omeprazole 20 mg, Route:         No Longer                 



                PO, Drug         Active                      [facility]



                form: DRC,                                         



                Daily, Dosing                                         



                Weight                                          



                95.455, kg,                                         



                Start date:                                         



                18                                         



                9:00:00 CST,                                         



                Duration: 30                                         



                day, Stop                                         



                date:                                           



                18                                         



                9:00:00 CDT                                         



                                                                



                                                                

 

        Brilinta Notes: (Same         No Longer                 



                as: Brilinta)         Active                      [facility]



                                                                



                                                                



                                                                

 

        glucagon 1 mg, Route:         No Longer                 



                INJ, Drug         Active                      [facility]



                form:                                           



                PDR/INJ, PRN,                                         



                PRN Blood                                         



                Glucose                                         



                Results,                                         



                Start date:                                         



                18                                         



                1:48:00 CST,                                         



                Duration: 30                                         



                day, Stop                                         



                date:                                           



                18                                         



                2:47:00 CDT                                         



                                                                



                                                                

 

        Dextrose 50% in 50 mL, Route:         No Longer                 



        Water IV IVP, Start         Active                      [facility]



                date:                                           



                18                                         



                1:48:00 CST,                                         



                Duration: 30                                         



                day, Stop                                         



                date:                                           



                18                                         



                2:47:00 CDT,                                         



                PRN Blood                                         



                Glucose                                         



                Results                                         



                                                                



                                                                

 

        Humalog Notes: (Same         Inactive                 



                as: Humalog )                                     [place]



                Roll in palms                                         



                of hands                                         



                gently;  Do                                         



                not shake                                         



                `vigorously.                                         



                "Single                                         



                Patient Use                                         



                Only "                                          



                WASTE: F/P -                                         



                Black; E -                                         



                Municipal                                         



                Trash Bin                                         



                Stable for 28                                         



                days at room                                         



                temperature.                                         



                Expires in                                         



                _____ days                                         



                from                                            



                _____________                                         



                _Date                                           



                                                                



                                                                

 

        Humalog Notes: (Same         Inactive                 



                as: Humalog )                                     [place]



                Roll in palms                                         



                of hands                                         



                gently;  Do                                         



                not shake                                         



                `vigorously.                                         



                "Single                                         



                Patient Use                                         



                Only "                                          



                WASTE: F/P -                                         



                Black; E -                                         



                Municipal                                         



                Trash Bin                                         



                Stable for 28                                         



                days at room                                         



                temperature.                                         



                Expires in                                         



                _____ days                                         



                from                                            



                _____________                                         



                _Date                                           



                                                                



                                                                

 

        Calcium Carbonate Notes: (Same         No Longer                 



        500 MG Chewable As: Tums)         Active                      [facility]

est



        Tablet  Calcium                                         



                Carbonate 500                                         



                mg = 200 mg                                         



                elemental                                         



                calcium                                         



                Dose =                                          



                ______ mg                                         



                calcium                                         



                carbonate                                         



                (______ mg                                         



                elemental                                         



                calcium)                                         



                                                                



                                                                

 

        potassium Notes: (Same         No Longer                 



        phosphate as: K           Active                      [facility]



                Phosphate.)                                         



                1 mMol                                          



                phoshate has                                         



                1.47 mEq                                         



                potassium                                         



                Infuse over 4                                         



                hours                                           



                                                                



                                                                

 

        sodium phosphate 30 mmol, 10         No Longer                 

H



                mL, Route:         Active                      [facility]



                IVPB, PRN,                                         



                Dosing Weight                                         



                95.6, kg, PRN                                         



                Abnormal Lab                                         



                Result, Start                                         



                date:                                           



                18                                         



                23:01:00 CST,                                         



                Duration: 30                                         



                day, Stop                                         



                date:                                           



                18                                         



                0:00:00 CDT,                                         



                FOR ICU USE                                         



                ONLY                                            



                                                                

 

        Potassium Chloride Notes: (Same         No Longer                 



                as: Potassium         Active                      [facility]



                Chloride)                                         



                                                                



                                                                

 

        Calcium Gluconate Notes: WASTE:         No Longer                 



                F/P - Sink; E         Active                      [facility]



                - [employer]                                         



                Trash Bin                                         



                                                                



                                                                

 

        Magnesium Oxide Notes: (Same         No Longer                 



                as: Mag-Ox         Active                      [facility]



                400)                                            



                Magnesium                                         



                oxide                                           



                [id number]                                         



                elemental                                         



                magnesium                                         



                Dose=____mg                                         



                magnesium                                         



                oxide (___mg                                         



                elemental                                         



                magnesium)                                         



                                                                



                                                                

 

        Magnesium Sulfate Notes: WASTE:         No Longer                 



                F/P - Sink; E         Active                      [facility]



                - Municipal                                         



                Trash Bin                                         



                                                                



                                                                

 

        potassium Notes: (Same         No Longer                 



        phosphate-sodium as: Phos-NaK)         [facility]



        phosphate 250  Each 1.5 gm                                         



        mg-280 mg-160 mg pkt has 250mg                                         



        oral powder for phosphorous.                                         



        reconstitution Mix w/2.5oz                                         



                water and                                         



                stir.                                           



                                                                



                                                                

 

        famotidine Notes: (Same         Inactive                 



                as: Pepcid)                                     [facility]



                                                                



                                                                



                                                                

 

        Amlodipine Notes: (Same         No Longer                 



                as: Norvasc)         Active                      [facility]



                                                                



                                                                



                                                                

 

        3 ML Insulin Notes: (Same         No Longer                 



        Glargine 100 as: Lantus)         Active                      [facility]

st



        UNT/ML Prefilled Do not hold                                         



        Syringe [Lantus] insulin                                         



                without                                         



                contacting                                         



                prescriber                                         



                WASTE: F/P -                                         



                Black; E -                                         



                Municipal                                         



                Trash Bin                                         



                "single                                         



                patient use                                         



                only"                                           



                                                                



                                                                

 

        Saline Flush 0.9% Notes: (Same         No Longer                 



                as: BD          Active                      [facility]



                Posiflush)                                         



                                                                



                                                                

 

        Morphine Notes: (Same         No Longer                 



                as:MORPhine         Active                      [facility]



                Sulfate)                                         



                                                                



                                                                

 

        Saline Flush 0.9% Notes: (Same         No Longer                 



                as: BD          Active                      [facility]



                Posiflush)                                         



                                                                



                                                                

 

        Albuterol 0.833 Notes: (Same         No Longer                 

H



        MG/ML / as: Duoneb)         Active                      [facility]



        Ipratropium                                                 



        Bromide 0.167                                                 



        MG/ML Inhalant                                                 



        Solution                                                 



                                                                

 

        Nystatin 100 Notes: (Same         No Longer                 



        UNT/MG Topical as:Mycostatin         Active                      [facility]

[place]



        Powder  , Nilstat)                                         



                For external                                         



                use only.                                         



                                                                



                                                                

 

        Nitroglycerin Notes: (Same         No Longer                 



                as:Tridil)         Active                      [facility]



                Final conc =                                         



                0.4 mg/ml.                                         



                Premix                                          



                bottle.                                         



                                                                



                                                                

 

        eptifibatide 0.75 Notes: (Same         Inactive                 



        MG/ML Injectable as:                                         [facility]



        Solution Integrelin)                                         



        [Integrilin] Final conc =                                         



                0.75 mg/mL -                                         



                Premix Bottle                                         



                                                                



                                                                

 

        Acetaminophen 325 Notes: (Same         No Longer                 



        MG / Hydrocodone as: Norco         Active                      [facility]



        Bitartrate 5 /5)  Do                                         



        Oral Tablet not exceed                                         



                4gm/day of                                         



                acetaminophen                                         



                .                                               



                                                                

 

        Ondansetron Notes: (Same         No Longer                 



                as: Zofran)         Active                      [facility]



                ***                                             



                MEDICATION                                         



                WASTE ***                                         



                Product Size:                                         



                 4 mg Product                                         



                Wasted:  _0__                                         



                mg                                              



                                                                

 

        Acetaminophen Notes: Do not         No Longer                 



                exceed 4         Active                      [facility]



                gm/day.                                         



                (Same as:                                         



                Tylenol)                                         



                                                                



                                                                

 

        Sodium Chloride 750 mL, Rate:         No Longer                 



        0.9%  mL 75 ml/hr,         Active                      [facility]

st



                Infuse over:                                         



                10 hr, Route:                                         



                IV, Dosing                                         



                Weight 95.455                                         



                kg, Total                                         



                Volume: 750,                                         



                Start date:                                         



                18                                         



                17:23:00 CST,                                         



                Duration: 10                                         



                hr, Stop                                         



                date:                                           



                18                                         



                3:22:00 CST,                                         



                2.19, m2                                         



                                                                



                                                                

 

        200 ACTUAT Notes:          No Longer                 



        Albuterol 0.09 Albuterol 90         Active                      [facility]

[place]



        MG/ACTUAT Metered microgram/inh                                         



        Dose Inhaler 8gm HFA                                         



        [ProAir HFA] WASTE:                                          



                Aerosol -                                         



                Return to                                         



                Pharmacy                                         



                Same as:                                         



                Ramila Garcia                                         



                                                                



                                                                

 

        Sodium Chloride 250 mL,         No Longer                 



        0.9% IV Route: IVPB,         Active                      [facility]



                Start date:                                         



                18                                         



                16:16:00 CST,                                         



                Duration: 30                                         



                day, Stop                                         



                date:                                           



                18                                         



                17:15:00 CDT,                                         



                PRN Line                                         



                Flush                                           



                                                                



                                                                

 

        BD Normal Saline Notes: (Same         Inactive                 

H



        Flush   as: BD                                      [facility]



                Posiflush)                                         



                                                                



                                                                

 

        Sodium Chloride 750 mL, Rate:         Inactive                 

H



        0.9%  mL 150 ml/hr,                                     [facility]



                Infuse over:                                         



                5 hr, Route:                                         



                IV, Dosing                                         



                Weight 95.455                                         



                kg, Total                                         



                Volume: 750,                                         



                Start date:                                         



                18                                         



                15:33:00 CST,                                         



                Duration: 30                                         



                day, Stop                                         



                date:                                           



                18                                         



                15:32:00 CDT,                                         



                2.19, m2                                         



                                                                



                                                                

 

        Sodium Chloride 250 mL,         Inactive                 



        0.9% (Bolus) IV Infuse Over:                                     [facility]

thwest



                1 hr, Route:                                         



                IV, ONCALL,                                         



                Priority:                                         



                Routine,                                         



                Dosing Weight                                         



                95.455 kg,                                         



                Start date:                                         



                18                                         



                15:00:00 CST,                                         



                Duration: 1                                         



                doses or                                         



                times                                           



                                                                



                                                                

 

        Sodium Chloride 750 mL, Rate:         Inactive                 

H



        0.9%  mL 150 ml/hr,                                     [facility]

est



                Infuse over:                                         



                5 hr, Route:                                         



                IV, Dosing                                         



                Weight 95.455                                         



                kg, Total                                         



                Volume: 750,                                         



                Start date:                                         



                18                                         



                14:07:00 CST,                                         



                Duration: 24                                         



                hr, Stop                                         



                date:                                           



                18                                         



                14:06:00 CST,                                         



                2.19, m2                                         



                                                                



                                                                

 

        Insulin Lispro Notes: (Same         Inactive                 



                as: Humalog )                                     [facility]



                Roll in palms                                         



                of hands                                         



                gently;  Do                                         



                not shake                                         



                `vigorously.                                         



                "Single                                         



                Patient Use                                         



                Only "                                          



                WASTE: F/P -                                         



                Black; E -                                         



                Municipal                                         



                Trash Bin                                         



                Stable for 28                                         



                days at room                                         



                temperature.                                         



                Expires in                                         



                _____ days                                         



                from                                            



                _____________                                         



                _Date                                           



                                                                



                                                                

 

        Dextrose 50% 12.5 gm, 25         Inactive                 



        Syringe mL, Route:                                     [facility]



                IVP, Drug                                         



                Form: INJ,                                         



                Dosing Weight                                         



                95.455, kg,                                         



                PRN, PRN                                         



                Blood Glucose                                         



                Results,                                         



                Start date:                                         



                18                                         



                14:01:00 CST,                                         



                Duration: 30                                         



                day, Stop                                         



                date:                                           



                18                                         



                15:00:00 CDT                                         



                                                                



                                                                

 

        Glucagon 1 mg, Route:         Inactive                 



                IM, Drug                                     [facility]



                form:                                           



                PDR/INJ, PRN,                                         



                Dosing Weight                                         



                95.455, kg,                                         



                PRN Blood                                         



                Glucose                                         



                Results,                                         



                Start date:                                         



                18                                         



                14:01:00 CST,                                         



                Duration: 30                                         



                day, Stop                                         



                date:                                           



                18                                         



                15:00:00 CDT                                         



                                                                



                                                                

 

        Sodium Chloride 1,000 mL,         Inactive                 



        0.9% IV 1,000 mL Rate: 100                                 2018    [facility]



                ml/hr, Infuse                                         



                over: 10 hr,                                         



                Route: IV,                                         



                Dosing Weight                                         



                95.455 kg,                                         



                Total Volume:                                         



                1,000, Start                                         



                date:                                           



                18                                         



                13:48:00 CST,                                         



                Duration: 30                                         



                day, Stop                                         



                date:                                           



                18                                         



                13:47:00 CDT,                                         



                2.19, m2                                         



                                                                



                                                                

 

        Glyburide 2 mg, PO,         No Longer                 



                BID, 0          Active                      [facility]



                Refill(s)                                         



                                                                



                                                                

 

        Magic Mouth Wash 5 ml, S&SPIT,         Active                  

[facility]



        (Maalox/Benadryl/L QID, PRN                                 2017    [facility]

p



        idocaine/Nystatin) Mouth Pain, #                                        

 



        1:1:1:1 90 mL, 0                                         



                Refill(s)                                         



                                                                



                                                                

 

        Symbicort 160/4.5 2 puff,         Active                   Med

ical



        inhalation aerosol INHALATION,                                 2017    [facility]

roup



        with adapter BID, 0                                          



                Refill(s)                                         



                                                                



                                                                

 

        Flumazenil Notes: (Same         Inactive                  [facility]

r



                as:                                     2015    [facility]



                Romazicon)                                         



                                                                



                                                                

 

        Naloxone Notes: Same         Inactive                  [facility]



                as Narcan                                 2015    [facility]



                                                                



                                                                



                                                                

 

        BD Normal Saline Notes: (Same         Inactive                 

[facility]



        Flush   as: BD                                  2015    [facility]



                Posiflush)                                         



                                                                



                                                                

 

        Dextrose 5% with 1,000 mL,         Inactive                  S

ugar



        0.9% NaCl IV 1,000 Rate: 125                                     [facility]

d



        mL      ml/hr, Infuse                                         



                over: 8 hr,                                         



                Route: IV,                                         



                Dosing Weight                                         



                95.455 kg,                                         



                Total Volume:                                         



                1,000, Start                                         



                date:                                           



                08/24/15                                         



                6:00:00,                                         



                Duration: 1                                         



                day, Stop                                         



                date:                                           



                08/25/15                                         



                5:59:00                                         



                                                                



                                                                

 

        PEG-3350/ELECTROLY followed         Active                   [facility]

edical



        MICHELLE 236 [employer] SOLR handout                                 2015    [facility]



                directions                                         



                                                                



                                                                

 

        Acetaminophen 650 mg, 2         Inactive                 



                tab, Route:                                     [facility]



                PO, Drug                                         



                form: TAB,                                         



                Q4H, Dosing                                         



                Weight                                          



                92.273, kg,                                         



                PRN Headache                                         



                1-5, Start                                         



                date:                                           



                14                                         



                9:41:00,                                         



                Duration: 30                                         



                day, Stop                                         



                date:                                           



                14                                         



                9:40:00Do not                                         



                exceed 4                                         



                gm/day.                                         



                (Same as:                                         



                Tylenol)                                         



                                                                



                                                                

 

        Acetaminophen 325 2 tab, Route:         Inactive                 



        MG / Hydrocodone PO, Drug                                     [facility]

est



        Bitartrate 5 MG Form: TAB,                                         



        Oral Tablet Dosing Weight                                         



                92.273, kg,                                         



                Q4H, PRN Pain                                         



                Score 6-10,                                         



                Start date:                                         



                14                                         



                9:41:00,                                         



                Duration: 30                                         



                day, Stop                                         



                date:                                           



                14                                         



                9:40:00(Same                                         



                as: Norco                                         



                325/5)  Do                                         



                not exceed                                         



                4gm/day of                                         



                acetaminophen                                         



                .                                               



                                                                

 

        Sodium Chloride 1,000 mL,         Inactive                 



        0.154 MEQ/ML Rate: 150                                     [facility]



        Injectable ml/hr, Infuse                                         



        Solution over: 6.7 hr,                                         



                Route: IV,                                         



                Dosing Weight                                         



                92.273 kg,                                         



                Total Volume:                                         



                1,000, Start                                         



                date:                                           



                14                                         



                9:41:00,                                         



                Duration: 30                                         



                day, Stop                                         



                date:                                           



                14                                         



                9:40:00                                         



                                                                



                                                                

 

        Nitroglycerin 0.4 mg, 1         Inactive                   



                tab, Route:                                     [facility], Drug                                         



                form: TAB,                                         



                Q5Min, Dosing                                         



                Weight                                          



                92.273, kg,                                         



                PRN Chest                                         



                Pain, Start                                         



                date:                                           



                14                                         



                9:41:00,                                         



                Duration: 3                                         



                doses or                                         



                times, Stop                                         



                date: Limited                                         



                # of                                            



                times(Same                                         



                as:Nitroquick                                         



                , Nitrostat)                                         



                "Do Not                                         



                Crush"                                          



                Sublingual                                         



                tablet                                          



                                                                



                                                                

 

        Triamcinolone 1 appl, TOP,         Active                    



        Acetonide 1 MG/ML BID, to                                     [facility]



        Topical Cream affected                                         



                area, 0                                         



                Refill(s)to                                         



                affected area                                         



                                                                



                                                                

 

        Nitroglycerin 0.4 0.4 mg = 1         No Longer                 /  M

H



        MG Sublingual tab, SL,         Active                      [facility]



        Tablet [Nitrostat] Q5Min, Chest                                         



                Pain, not to                                         



                exceed 3                                         



                doses/15                                         



                min--if pain                                         



                persists,                                         



                seek medical                                         



                attention, #                                         



                100 tab, 0                                         



                Refill(s)not                                         



                to exceed 3                                         



                doses/15                                         



                min--if pain                                         



                persists,                                         



                seek medical                                         



                attention                                         



                                                                



                                                                

 

        prasugrel 10 MG 10 mg = 1         Active                    



        Oral Tablet tab, PO,                                     [facility]



        [Effient] Daily, # 30                                         



                tab, 0                                          



                Refill(s)                                         



                                                                



                                                                

 

        Aspirin 81 MG 81 mg = 1         Active                    



        Enteric Coated tab, PO,                                     [facility]

t



        Tablet  Daily, # 0                                         



                tab, 0                                          



                Refill(s)                                         



                                                                



                                                                

 

        Ranitidine 150  mg = 1         Active                    



        Oral Tablet tab, PO, BID,                                     [facility]



                # 60 tab, 0                                         



                Refill(s)                                         



                                                                



                                                                

 

        atorvastatin 40 mg 40 mg = 1         No Longer                 /  M

H



        oral tablet tab, PO,         Active                      [facility]



                Bedtime, # 30                                         



                tab, 0                                          



                Refill(s)                                         



                                                                



                                                                

 

        Amlodipine 10 MG 10 mg = 1         Active                    



        Oral Tablet tab, PO,                                     [facility]



        [Norvasc] Daily, # 30                                         



                tab, 0                                          



                Refill(s)                                         



                                                                



                                                                

 

        200 ACTUAT INHALATION,         Active                  



        Ipratropium PRN, 0                                      [facility]



        [place] 0.017 Refill(s)                                         



        MG/ACTUAT Metered                                                 



        Dose Inhaler                                                 



        [Atrovent]                                                 



                                                                

 

        sitagliptin 100  mg = 1         Active                    



        Oral Tablet tab, PO,                                     [facility]



        [Januvia] Daily, # 30                                         



                tab, 0                                          



                Refill(s)                                         



                                                                



                                                                

 

        Losartan Potassium 100 mg = 1         Active                  



        100 MG Oral Tablet tab, PO,                                     [facility]

[facility]



        [Cozaar] Daily, # 30                                         



                tab, 0                                          



                Refill(s)                                         



                                                                



                                                                

 

        meloxicam 15 mg 15 mg = 1         Active                    



        oral tablet tab, PO,                                     [facility]



                Every Other                                         



                Day, # 30                                         



                tab, 0                                          



                Refill(s)                                         



                                                                



                                                                

 

        Fenofibrate 160  mg = 1         Active                    



        Oral Tablet tab, PO,                                 2014



                Daily, # 30                                         



                tab, 0                                          



                Refill(s)                                         



                                                                



                                                                

 

        allopurinol 300 mg 300 mg = 1         Active                    



        oral tablet tab, PO,                                 2014



                Daily, # 30                                         



                tab, 0                                          



                Refill(s)                                         



                                                                



                                                                

 

        bisoprolol 10 mg 10 mg = 1         Active                    



        oral tablet tab, PO,                                 2014



                QNoon,                                          



                bisoprolol                                         



                fumarate, #                                         



                30 tab, 0                                         



                Refill(s)biso                                         



                prolol                                          



                fumarate                                         



                                                                



                                                                

 

        spironolactone 25 25 mg = 1         Active                    



        mg oral tablet tab, PO, BID,                                     [facility]

[facility]



                # 180 tab, 0                                         



                Refill(s)                                         



                                                                



                                                                

 

        glimepiride 2 mg 2 mg = 1 tab,         Active                  /  M

H



        oral tablet PO, BID,                                 2014



                am/pm, # 30                                         



                tab, 0                                          



                Refill(s)am/p                                         



                m                                               



                                                                

 

        Metformin 1,000 mg = 1         Active                  



        hydrochloride 1000 tab, PO, BID,                                 2014



        MG Oral Tablet # 30 tab, 0                                         



                Refill(s)                                         



                                                                



                                                                

 

        TRIAMCINOLONE Apply to         No Longer                  Medi

[facility]



        ACETONIDE 0.1 % affected area         Active                      Gr

oup



        CREA    twice daily                                         



                                                                



                                                                

 

        BISOPROLOL Take 1 tablet         Active                   Medi

[facility]



        FUMARATE 10 MG PO daily at                                     Group



        TABS    noon                                            



                                                                



                                                                

 

        ATORVASTATIN Take 1 tablet         Active                   Me

dical



        CALCIUM 40 MG TABS by mouth                                     Grou

p



                daily                                           



                                                                



                                                                

 

        NEOMYCIN-POLYMYXIN 4 drop AS 2x         No Longer                  Medical



        -HC 3.5-98913-3 daily 10 days         Active                      Gr

oup



        SOLN                                                    



                                                                



                                                                

 

        BISOPROLOL Take 1 tablet         Active                   Medi

[facility]



        FUMARATE 10 MG PO daily at                                     Group



        TABS    noon                                            



                                                                



                                                                

 

        ATORVASTATIN Take 1 tablet         Active                   Me

dical



        CALCIUM 40 MG TABS by mouth                                     Grou

p



                daily                                           



                                                                



                                                                

 

        ATORVASTATIN Take 1 tablet         Active                   Me

dical



        CALCIUM 40 MG TABS by mouth                                     Grou

p



                daily                                           



                                                                



                                                                

 

        NITROSTAT 0.4 MG one every 5         Active                   

Medical



        SUBL    minutes x 3                                     Group



                as needed for                                         



                chest pain.                                         



                if no relief                                         



                after 3 to go                                         



                to the ER.                                         



                                                                



                                                                

 

        NITROSTAT 0.4 MG one every 5         Active                   

Medical



        SUBL    minutes x 3                                     Group



                as needed for                                         



                chest pain.                                         



                if no relief                                         



                after 3 to go                                         



                to the ER.                                         



                                                                



                                                                

 

        NITROSTAT 0.4 MG one every 5         Active                   

Medical



        SUBL    minutes x 3                                     Group



                as needed for                                         



                chest pain.                                         



                if no relief                                         



                after 3 to go                                         



                to the ER.                                         



                                                                



                                                                

 

        TRIAMCINOLONE apply           No Longer                  Medic

al



        ACETONIDE 0.1 % affected area         Active                      Gr

oup



        CREA    bid prn                                         



                                                                



                                                                

 

        MELOXICAM 15 MG 1 tablet by         Active                   

edical



        TABS    mouth every                                     Group



                other day                                         



                                                                



                                                                

 

        CYCLOBENZAPRINE 1/2 to 1 q8h         No Longer                 /  M

[facility]



        HCL 10 MG TABS prn             Active                      Group



                                                                



                                                                



                                                                

 

        CYCLOBENZAPRINE 1/2 to 1 q8h         No Longer                 0411/  M

[facility]



        HCL 10 MG TABS prn             Active                      Group



                                                                



                                                                



                                                                

 

        MELOXICAM 15 MG 1 tablet by         Active                   

edical



        TABS    mouth every                                     Group



                day                                             



                                                                



                                                                

 

        CYCLOBENZAPRINE 1/2 to 1 q8h         No Longer                 //  M

[facility]



        HCL 10 MG TABS prn             Active                      Group



                                                                



                                                                



                                                                

 

        MELOXICAM 15 MG 1 tablet by         Active                   

edical



        TABS    mouth every                                     Group



                day                                             



                                                                



                                                                

 

        CYCLOBENZAPRINE 1/2 to 1 q8h         No Longer                 /  M

[facility]



        HCL 10 MG TABS prn             Active                      Group



                                                                



                                                                



                                                                

 

        MELOXICAM 15 MG 1 tablet by         Active                    [facility]

edical



        TABS    mouth every                                     Group



                day                                             



                                                                



                                                                

 

        CYCLOBENZAPRINE 1/2 to 1 q8h         No Longer                 /  M

[facility]



        HCL 10 MG TABS prn             Active                      Group



                                                                



                                                                



                                                                

 

        MELOXICAM 15 MG 1 tablet by         Active                    [facility]

edical



        TABS    mouth every                                     Group



                day                                             



                                                                



                                                                

 

        MELOXICAM 15 MG 1 tablet by         Active                    [facility]

edical



        TABS    mouth every                                     Group



                day                                             



                                                                



                                                                

 

        ECOTRIN LOW take one         Active                   Medical



        STRENGTH 81 MG tablet daily                                     Grou

p



        TBEC                                                    



                                                                



                                                                

 

        EFFIENT 10 MG TABS take one         Active                   M

edical



                daily                                       Group



                                                                



                                                                



                                                                

 

        EFFIENT 10 MG TABS take one         Active                     M

edical



                daily                                       Group



                                                                



                                                                



                                                                

 

        METFORMIN HCL 1000 1 tablet po         Active                  

[facility]



        MG TABS bid for                                     Group



                diabetes                                         



                                                                



                                                                

 

        GLIMEPIRIDE 2 MG 1 tablet po         Active                     

Medical



        TABS    bid (AM                                 2013    Group



                &noon)                                          



                                                                



                                                                

 

        SPIRONOLACTONE 25 2 daily         Active                     Med

ical



        MG TABS                                             Group



                                                                



                                                                



                                                                

 

        ALLOPURINOL 300 MG 1 PO daily         Active                    

 Medical



        TABS                                            2013    Group



                                                                



                                                                



                                                                

 

        FENOFIBRATE 160 MG 1 PO daily         Active                    

 Medical



        TABS                                            2013    Group



                                                                



                                                                



                                                                

 

        COZAAR 100 MG TABS 1 PO daily         Active                    

 Medical



                                                        2013    Group



                                                                



                                                                



                                                                

 

        JANUVIA 100 MG 1 PO daily         Active                     Med

ical



        TABS                                            2013    Group



                                                                



                                                                



                                                                

 

        ATROVENT HFA 17 prn             Active                     Medic

al



        MCG/ACT AERS                                         2013    Group



                                                                



                                                                



                                                                

 

        NORVASC 10 MG TABS 1 PO once         Active                     

Medical



                daily                                   2013    Group



                                                                



                                                                



                                                                

 

        RANITIDINE  1 PO BID         Active                     M

edical



        MG TABS                                         2013    Group



                                                                



                                                                



                                                                

 

        METFORMIN HCL 1000 1 tablet po         Active                  

[facility]



        MG TABS bid for                                 2013    Group



                diabetes                                         



                                                                



                                                                

 

        SPIRONOLACTONE 25 one twice a         Active                    

 Medical



        MG TABS day                                     2013    Group



                                                                



                                                                



                                                                

 

        ALLOPURINOL 300 MG 1 PO daily         Active                    

 Medical



        TABS                                            2013    Group



                                                                



                                                                



                                                                

 

        FENOFIBRATE 160 MG 1 PO daily         Active                    

 Medical



        TABS                                            2013    Group



                                                                



                                                                



                                                                

 

        COZAAR 100 MG TABS 1 PO daily         Active                    

 Medical



                                                        2013    Group



                                                                



                                                                



                                                                

 

        ATROVENT HFA 17 prn             Active                     Medic

al



        MCG/ACT AERS                                         2013    Group



                                                                



                                                                



                                                                

 

        NORVASC 10 MG TABS 1 PO once at         Active                    

 Medical



                bedtime                                 2013    Group



                                                                



                                                                



                                                                

 

        RANITIDINE  1 PO BID         Active                     M

edical



        MG TABS                                         2013    Group



                                                                



                                                                



                                                                

 

        METFORMIN HCL 1000 1 tablet po         Active                  

[facility]



        MG TABS bid for                                 2013    Group



                diabetes                                         



                                                                



                                                                

 

        METFORMIN HCL 1000 1 tablet po         Active                  

[facility]



        MG TABS bid for                                 2013    Group



                diabetes                                         



                                                                



                                                                

 

        ALLOPURINOL 300 MG 1 PO daily         Active                    

 Medical



        TABS                                            2013    Group



                                                                



                                                                



                                                                

 

        JANUVIA 100 MG 1 PO daily         Active                     Med

ical



        TABS                                            2013    Group



                                                                



                                                                



                                                                

 

        RANITIDINE  1 PO BID         Active                     M

edical



        MG TABS                                             Group



                                                                



                                                                



                                                                

 

        SPIRONOLACTONE 25 one twice a         Active                    

 Medical



        MG TABS day                                         Group



                                                                



                                                                



                                                                

 

        NORVASC 10 MG TABS 1 PO once at         Active                    

 Medical



                bedtime                                     Group



                                                                



                                                                



                                                                

 

        METFORMIN HCL 1000 1 tablet po         Active                  

[facility]



        MG TABS bid for                                     Group



                diabetes                                         



                                                                



                                                                

 

        ALLOPURINOL 300 MG 1 PO daily         Active                    

 Medical



        TABS                                                Group



                                                                



                                                                



                                                                

 

        RANITIDINE  1 PO BID         Active                     M

edical



        MG TABS                                             Group



                                                                



                                                                



                                                                

 

        SPIRONOLACTONE 25 one twice a         Active                    

 Medical



        MG TABS day                                         Group



                                                                



                                                                



                                                                

 

        METFORMIN HCL 1000 1 tablet po         Active                  

H Medical



        MG TABS bid for                                     Group



                diabetes                                         



                                                                



                                                                

 

        ALLOPURINOL 300 MG 1 PO daily         Active                    

 Medical



        TABS                                                Group



                                                                



                                                                



                                                                

 

        JANUVIA 100 MG 1 PO daily         Active                     Med

ical



        TABS                                                Group



                                                                



                                                                



                                                                

 

        RANITIDINE  1 PO BID         Active                    [facility]

edical



        MG TABS                                             Group



                                                                



                                                                



                                                                

 

        METFORMIN HCL 1000 1 tablet po         Active                  

H Medical



        MG TABS bid for                                     Group



                diabetes                                         



                                                                



                                                                

 

        LOVASTATIN 40 MG 1 PO daily         No Longer                   

 Medical



        TABS                    Active                      Group



                                                                



                                                                



                                                                

 

        FUROSEMIDE 40 MG 1 PO daily         No Longer                   

 Medical



        TABS                    Active                      Group



                                                                



                                                                



                                                                

 

        FUROSEMIDE 40 MG 1 PO daily         No Longer                   

 Medical



        TABS                    Active                      Group



                                                                



                                                                



                                                                

 

        FUROSEMIDE 40 MG 1 PO daily         No Longer                   

 Medical



        TABS                    Active                      Group



                                                                



                                                                



                                                                

 

        FUROSEMIDE 40 MG 1 PO daily         No Longer                   

 Medical



        TABS                    Active                      Group



                                                                



                                                                



                                                                

 

        ALDACTONE 25 MG Two tabs by         No Longer                   

 Medical



        TABS    mouth every         Active                      Group



                day                                             



                                                                



                                                                

 

        ALDACTONE 25 MG Two tabs by         No Longer                   

 Medical



        TABS    mouth every         Active                      Group



                day                                             



                                                                



                                                                

 

        ALDACTONE 25 MG Two tabs by         No Longer                   

 Medical



        TABS    mouth every         Active                      Group



                day                                             



                                                                



                                                                







Allergies, Adverse Reactions, Alerts







        Substance Category Reaction Severity Reaction Status  Date    Comments S

ource



                                        type            Reported         



                                                                        



                                                                        



                                                                        

 

        NAPROXEN Drug                    NAPROXEN                         



                allergy                                                 Medical



                                                                        Group



                                                                        



                                                                        

 

        IODINE  Drug                    IODINE                          



                allergy                                                 Medical



                                                                        Group



                                                                        



                                                                        

 

        MECLOMEN Drug                    MECLOMEN                         



                allergy                                                 Medical



                                                                        Group



                                                                        



                                                                        

 

        CODEINE Drug                    CODEINE                         



                allergy                                                 Medical



                                                                        Group



                                                                        



                                                                        

 

        NAPROXEN DR Drug                    NAPROXEN                         



                allergy                 DR                              Medical



                                                                        Group



                                                                        



                                                                        

 

        codeine<sup Assertion                 Drug    Active          Data    MH



        >1</sup>                         allergy                 migrated [facility]

l



                                                                from [facility]



                                                                [facility] 



                                                                on 3/2/16. 



                                                                Originally 



                                                                documented 



                                                                as CODEINE. 



                                                                GI upset 



                                                                        



                                                                        

 

        naproxen<magaña Assertion raise           Drug    Active          Data migra

shanae from [facility] on 

8/22/15. Originally documented as NAPROXEN  increased BP [facility]



        p>2,            blood           allergy                 Data migrated fr

om [facility] on 4/30/15. Originally 

documented as NAPROXEN. increased BP    Medical



        3</sup>         pressure                                         Group



                                                                        



                                                                        

 

        meclofenama Assertion cant            Drug    Active          Data    MH



        te<sup>4</s         remember         allergy                 migrated Me

dical



        up>                                                     from [facility]



                                                                [facility] 



                                                                on 4/30/15. 



                                                                Originally 



                                                                documented 



                                                                as      



                                                                MECLOMEN. 



                                                                pt unuse 



                                                                what med is 



                                                                or rxn  



                                                                        



                                                                        

 

        NKFA    Assertion                 Food    Active                  [facility]



                                        allergy                         Medical



                                                                        Group



                                                                        



                                                                        

 

        meclofenama Assertion cant            Drug    Active          Data    

 [place]



        te<sup>1</s         remember         allergy                 migrated [place]

nd



        up>                                                     from [facility] 



                                                                [facility] 



                                                                on 4/30/15. 



                                                                Originally 



                                                                documented 



                                                                as      



                                                                MECLOMEN. 



                                                                pt unuse 



                                                                what med is 



                                                                or rxn  



                                                                        



                                                                        







Immunizations







          Immunization Date      Site      Status    Last      Comments  Source



                    Given                         Updated             



                                                                      



                                                                      

 

          influenza virus 10/16/201           completed [place]      Location  [facility]

edical



          vaccine,  8                                       History:  Group



          inactivated<sup>                                         [facility] 



          1</sup>                                                     



                                                                      

 

          influenza virus  Left      completed [place]    Result    [facility]

edical



          vaccine,  6         Deltoid                       Comment: NDC: Group,





          inactivated<sup>                                         [id number] 

OPID [place]



          1</sup>                                              No adverse [place]

H



                                                            reactions [place]



                                                            noted.    



                                                                      



                                                                      

 

          influenza virus  Left      completed [place]    Result    [facility]

edical



          vaccine,  6         Deltoid                       Comment: NDC: Group



          inactivated<sup>                                         [id number] 



          2</sup>                                              No adverse 



                                                            reactions 



                                                            noted.    



                                                                      



                                                                      

 

          diphtheria/pertu  Left      completed [place]    Result     

Medical



          ssis,     6         Deltoid                       Comment: NDC: Group,





          acel/tetanus                                         [id number] OPID

 [place]



          adult<sup>3</sup                                             No    [place]

d,



          >                                                 adverse   [place]



                                                            reactions 



                                                            noted.    



                                                                      



                                                                      

 

          pneumococcal  Right     completed [place]    Result     Medi

sandra



          13-valent 6         Deltoid                       Comment: NDC: Group,





          vaccine<sup>4</s                                          

OPID [place]



          up>                                                   No    [place],



                                                            adverse   [place]



                                                            reaction  



                                                            noted.    



                                                                      



                                                                      

 

          diphtheria/pertu  Left      completed [place]    Result     

Medical



          ssis,     6         Deltoid                       Comment: NDC: Group



          acel/tetanus                                         [id number] 



          adult<sup>4</sup                                             No    



          >                                                 adverse   



                                                            reactions 



                                                            noted.    



                                                                      



                                                                      

 

          pneumococcal  Right     completed [place]    Result     Medi

sandra



          13-valent 6         Deltoid                       Comment: NDC: Group



          vaccine<sup>5</s                                          



          up>                                                   No    



                                                            adverse   



                                                            reaction  



                                                            noted.    



                                                                      



                                                                      

 

          influenza virus  Left      completed [place]    Result    [facility]

edical



          vaccine,  6         Deltoid                       Comment: NDC: Group,





          inactivated<sup>                                         [id number] 

OPID [place]



          2</sup>                                                     [place],[facility]



                                                                      



                                                                      

 

          influenza virus  Left      completed [place]    Result    [facility]

edical



          vaccine,  6         Deltoid                       Comment: NDC: Group



          inactivated<sup>                                         [id number] 



          3</sup>                                                     



                                                                      

 

          influenza            completed                      Medical



          immunization 1                                                 Group



          (Flu Vax) has                                                   



          been                                                        



          administered                                                   



                                                                      



                                                                      

 

          Hx influenza            completed [facility]        Result     Medi

[place]



          vaccine-unspecif 1                                       Comment:  Blel

up,



          ied<sup>5</sup>                                         fluzone.  OPID

 [place]



                                                            Migrated from [place]

H



                                                            OBS   ; Data [place]

st



                                                            migrated from 



                                                            [employer] 



                                                            on        



                                                            2016. 



                                                                      



                                                                      

 

          Hx influenza            completed [facility]        Result     Medi

sandra



          vaccine-unspecif 1                                       Comment:  [facility]

up



          ied<sup>6</sup>                                         fluzone.  



                                                            Migrated from 



                                                            OBS   ; Data 



                                                            migrated from 



                                                            [employer] 



                                                            on        



                                                            2016. 



                                                                      



                                                                      

 

          pneumococcal            completed [place]     Admin Note: [facility]

dical



          23-valent 0                                       per pt info Group,



          vaccine<sup>6</s                                                   OPI

D [place]



          up>                                                         [facility]



                                                                      



                                                                      

 

          pneumococcal            completed [place]     Admin Note: 



          23-valent 0                                       per pt info [facility]

t,



          vaccine<sup>1</s                                                   S[place]

ar [place]



          up>                                                         



                                                                      

 

          pneumococcal            completed [place]     Admin Note: [facility]

dical



          23-valent 0                                       per pt info Group



          vaccine<sup>7</s                                                   



          up>                                                         



                                                                      

 

          influenza            completed                      Medical



          immunization 0                                                 Group



          (Flu Vax) has                                                   



          been                                                        



          administered                                                   



                                                                      



                                                                      

 

          pneumococcal            completed                      Medi

sandra



          immunization 0                                                 Group



          administered                                                   



                                                                      



                                                                      

 

           pneumococcal              completed  [facility]         Result Comme

nt: pneumovax. Migrated from 

OBS   ; Data migrated from [facility] on 2016.  Medical



           23-valent  0                                           Result Comment

: pneumovax. Migrated from OBS   ; Data migrated 

from [facility] on 7/15/2015.[2016 Uncharted] Duplicate Group,



          vaccine<sup>7,                                                   OPID 

[place]



          8</sup>                                                     [place],



                                                                      [place]



                                                                      



                                                                      

 

           pneumococcal              completed  [facility]         Result Comme

nt: pneumovax. Migrated from 

OBS   ; Data migrated from [facility] on 2016.  Medical



           23-valent  0                                           Result Comment

: pneumovax. Migrated from OBS   ; Data migrated 

from [facility] on 7/15/2015.[2016 Uncharted] Duplicate Group



          vaccine<sup>8,                                                   



          9</sup>                                                     



                                                                      

 

          hepatitis B            completed                      Medic

al



          vaccine #3 3                                                 Group



                                                                      



                                                                      

 

          Hx hepatitis B            completed [facility]        Result     Me

dical



          vaccine<sup>9</s 3                                       Comment:  [facility]

up,[facility]



          up>                                               historical. OPID Sug

ar



                                                            Migrated from [place]

H



                                                            OBS   ; Data [facility]

st



                                                            migrated from 



                                                            [employer] [facility] 



                                                            on        



                                                            2016. 



                                                                      



                                                                      

 

          Hx hepatitis B            completed GE        Result     Me

dical



          vaccine<sup>10</ 3                                       Comment:  [facility]

up



          sup>                                              historical. 



                                                            Migrated from 



                                                            OBS   ; Data 



                                                            migrated from 



                                                            [employer] [facility] 



                                                            on        



                                                            2016. 



                                                                      



                                                                      

 

          hepatitis B            completed                      Medic

al



          vaccine #2 given 3                                                 [facility]

up



                                                                      



                                                                      

 

          hepatitis B            completed                      Medic

al



          vaccine #2 3                                                 Group



                                                                      



                                                                      

 

          Hx hepatitis B            completed GE        Result     Me

dical



          vaccine<sup>10</ 3                                       Comment:  [facility]

up,[facility]



          sup>                                              historical. OPID Sug

ar



                                                            Migrated from [place]

H



                                                            OBS   ; Data [facility]

st



                                                            migrated from 



                                                            [employer] [facility] 



                                                            on        



                                                            2016. 



                                                                      



                                                                      

 

          Hx hepatitis B            completed GE        Result     Me

dical



          vaccine<sup>11</ 3                                       Comment:  [facility]

up



          sup>                                              historical. 



                                                            Migrated from 



                                                            OBS   ; Data 



                                                            migrated from 



                                                            [facility] 



                                                            on        



                                                            2016. 



                                                                      



                                                                      

 

          hepatitis B            completed                      Medic

al



          vaccine #1 given 3                                                 [facility]

up



                                                                      



                                                                      

 

          hepatitis B            completed                      Medic

al



          vaccine #1 3                                                 Group



                                                                      



                                                                      

 

          Hx hepatitis B            completed GE        Result     Me

dical



          vaccine<sup>11</ 3                                       Comment:  [facility]

up,[facility]



          sup>                                              historical. OPID Sug

ar



                                                            Migrated from [place]

H



                                                            OBS   ; Data [facility]

st



                                                            migrated from 



                                                            [employer] [facility] 



                                                            on        



                                                            2016. 



                                                                      



                                                                      

 

          Hx hepatitis B            completed GE        Result    [facility]

dical



          vaccine<sup>12</ 3                                       Comment:  [facility]

up



          sup>                                              historical. 



                                                            Migrated from 



                                                            OBS   ; Data 



                                                            migrated from 



                                                            [employer] [facility] 



                                                            on        



                                                            2016. 



                                                                      



                                                                      

 

          dT (Diphtheria            completed                     [facility]

dical



          and Tetanus) 3                                                 Group



          booster given                                                   



                                                                      



                                                                      

 

          dT (Diphtheria            completed                     [facility]

dical



          and Tetanus) 3                                                 Group



          booster                                                     



                                                                      

 

          dT (Diphtheria            completed                     [facility]

dical



          and Tetanus) 3                                                 Group



          immunization for                                                   



          children, #1                                                   



                                                                      



                                                                      

 

          tetanus-diphther            completed GE        Result     

Medical



          ia        3                                       Comment: td. Group,M

H



          toxoids<sup>12</                                         Migrated from

 [facility]



          sup>                                              OBS   ; Data [facility],



                                                            migrated from [facility]



                                                            [employer] [facility] 



                                                            on        



                                                            2016. 



                                                                      



                                                                      

 

          tetanus-diphther            completed GE        Result     

Medical



          ia        3                                       Comment: td. Group



          toxoids<sup>13</                                         Migrated from

 



          sup>                                              OBS   ; Data 



                                                            migrated from 



                                                            [employer] [facility] 



                                                            on        



                                                            2016. 



                                                                      



                                                                      







Results







        Order Name Results Value   Reference Date    Interpretation Comments Annamaria

rce



                                Range                           



                                                                



                                                                



                                                                

 

        CHEM PANEL Glucose Lvl 133     65 - 99            Result   Med[facility]

l



                                                   Comment: Group



                                                        <br/>   



                                                          Fasting 



                                                        reference 



                                                        interval<br/> 



                                                        <br/>For 



                                                        someone 



                                                        without known 



                                                        diabetes, a 



                                                        glucose<br/>v 



                                                        alue >125 



                                                        mg/dL   



                                                        indicates 



                                                        that they may 



                                                        have<br/>diab 



                                                        etes and this 



                                                        should be 



                                                        confirmed 



                                                        with    



                                                        a<br/>follow- 



                                                        up test.<br/> 



                                                        <br/>   



                                                        <br/>Lab test 



                                                        performed 



                                                        by:<br/>[facility]  



                                                        Lab<br/>[id number] 



                                                        [facility]<br/>[facility] 



                                                        [place] 



                                                        [id number]<br 



                                                        />[facility] 



                                                                



                                                                

 

        CHEM PANEL BUN     21      7 -                     Medical



                                        /                   Group



                                                                



                                                                



                                                                



                                                                

 

        CHEM PANEL Creatinine 1.27    0.70 -             Result   Medical



                Lvl             1.           Comment: For Group



                                                        patients >49 



                                                        years of age, 



                                                        the reference 



                                                        limit<br/>for 



                                                        Creatinine is 



                                                        approximately 



                                                        13% higher 



                                                        for     



                                                        people<br/>id 



                                                        entified as 



                                                        -[facility] 



                                                        can.    



                                                                

 

        CHEM PANEL eGFR    58      > OR = 60                     Medical



                NON-AFR.         mL/min/1.7 /2020                   Group



                AMERICAN         3m2                             



                                                                



                                                                



                                                                

 

        CHEM PANEL eGFR  68      > OR = 60                     Med

ical



                AMERICAN         mL/min/1.7                    Group



                                3m2                             



                                                                



                                                                



                                                                

 

        CHEM PANEL B/C Ratio 17      6 - 22                      Medical



                                        /2020                   Group



                                                                



                                                                



                                                                



                                                                

 

        CHEM PANEL Sodium Lvl 140     135 - 146                     Medic

al



                                                           Group



                                                                



                                                                



                                                                



                                                                

 

        CHEM PANEL Potassium 4.6     3.5 - 5.3                     Medica

l



                Lvl                                        Group



                                                                



                                                                



                                                                



                                                                

 

        CHEM PANEL Chloride Lvl 102     98 - 110                     Medi

sandra



                                                           Group



                                                                



                                                                



                                                                



                                                                

 

        CHEM PANEL CO2     27      20 - 32                     Medical



                                        2020                   Group



                                                                



                                                                



                                                                



                                                                

 

        CHEM PANEL Calcium Lvl 9.7     8.6 - 10.3                     Med

ical



                                                           Group



                                                                



                                                                



                                                                



                                                                

 

        CHEM PANEL Total   6.9     6.1 - 8.1                     Medical



                Protein                                    Group



                                                                



                                                                



                                                                



                                                                

 

        CHEM PANEL Albumin Lvl 4.5     3.6 - 5.1                     Medi

sandra



                                                           Group



                                                                



                                                                



                                                                



                                                                

 

        CHEM PANEL Globulin 2.4     1.9 - 3.7                     Medical



                                        /2020                   Group



                                                                



                                                                



                                                                



                                                                

 

        CHEM PANEL A/G Ratio 1.9     1.0 - 2.5                     Medica

l



                                                           Group



                                                                



                                                                



                                                                



                                                                

 

        CHEM PANEL Bili Total 0.3     0.2 - 1.2                     Medic

al



                                                           Group



                                                                



                                                                



                                                                



                                                                

 

        CHEM PANEL Alk Phos 55      35 - 144                     Medical



                                        /2020                   Group



                                                                



                                                                



                                                                



                                                                

 

        CHEM PANEL ASPARTATE 22      10 - 35                     Medical



                TRANSAMINASE                                    Group



                                                                



                                                                



                                                                



                                                                

 

        CHEM PANEL ALANINE 29      9 - 46                      Medical



                AMINOTRANSFE                                    Group



                RASE                                            



                                                                



                                                                



                                                                

 

        CHEM PANEL Uric Acid 4.0     4.0 - 8.0            Result   Medica

l



                                                   Comment: Group



                                                        Therapeutic 



                                                        target for 



                                                        gout    



                                                        patients: 



                                                        <6.0    



                                                        mg/dL<br/> 



                                                        <br/>   



                                                        <br/>Lab test 



                                                        performed 



                                                        by:<br/>[facility]<br/>[id number] 



                                                        [facility]<br/>[place] 



                                                        [address]<br 



                                                        />[facility] 



                                                                



                                                                

 

        HEMATOLOGY WBC X 10x3 8.4     3.8 - 10.8            Result   Medi

sandra



                                                   Comment: Group



                                                         <br/>Lab 



                                                        test    



                                                        performed 



                                                        by:<br/>[facility]  



                                                        [facility]<br/>[address] 



                                                        [facility]<br/>[place], [place] 



                                                        [id number]<br 



                                                        />Srini [facility] 



                                                                



                                                                

 

        HEMATOLOGY RBC X 10x6 5.51    4.20 -  05                    Medical



                                5.80    /                   Group



                                                                



                                                                



                                                                



                                                                

 

        HEMATOLOGY Hgb     15.5    13.2 -                      Medical



                                17.1    /                   Group



                                                                



                                                                



                                                                



                                                                

 

        HEMATOLOGY Hct     46.6    38.5 -                      Medical



                                50.0    /2020                   Group



                                                                



                                                                



                                                                



                                                                

 

        HEMATOLOGY MCV     84.6    80.0 -  05                    Medical



                                100.0   /2020                   Group



                                                                



                                                                



                                                                



                                                                

 

        HEMATOLOGY MCH     28.1    27.0 -  05                    Medical



                                33.0    /2020                   Group



                                                                



                                                                



                                                                



                                                                

 

        HEMATOLOGY MCHC    33.3    32.0 -  05                    Medical



                                36.0    /2020                   Group



                                                                



                                                                



                                                                



                                                                

 

        HEMATOLOGY RDW     13.8    11.0 -  05                    Medical



                                15.0                       Group



                                                                



                                                                



                                                                



                                                                

 

        HEMATOLOGY Platelet 238     140 - 400                     Medical



                                        /                   Group



                                                                



                                                                



                                                                



                                                                

 

        HEMATOLOGY MPV     10.2    7.5 - 12.5 05                    Medical



                                        /                   Group



                                                                



                                                                



                                                                



                                                                

 

        LIPIDS  Chol    164     <200 mg/dL            Result   Medical



                                                   Comment: Group



                                                         <br/>Lab 



                                                        test    



                                                        performed 



                                                        by:<br/>[facility]  



                                                        Lab<br/>[id number] 



                                                        [facility]<br/>[place] 



                                                        [id number]<br 



                                                        />[facility] 



                                                                



                                                                

 

        LIPIDS  HDL     39      > OR = 40                    [facility]



                                mg/dL                      Group



                                                                



                                                                



                                                                



                                                                

 

        LIPIDS  Trig    310     <150 mg/dL            Result   Medical



                                        /2020           Comment: Group



                                                        <br/>If a 



                                                        non-fasting 



                                                        specimen was 



                                                        collected, 



                                                        consider<br/> 



                                                        repeat  



                                                        triglyceride 



                                                        testing on a 



                                                        fasting 



                                                        specimen<br/> 



                                                        if clinically 



                                                        indicated. 



                                                        <br/>Mitesh 



                                                        et al. J. of 



                                                        Clin.   



                                                        Lipidol. 



                                                        2015;9:129-16 



                                                        9.      



                                                                

 

        LIPIDS  LDL     88                         Result  [facility]



                (Calculated)                            Comment: Group



                                                        Reference 



                                                        range:  



                                                        <100<br/> 



                                                        <br/>Desirabl 



                                                        e range <100 



                                                        mg/dL for 



                                                        primary 



                                                        prevention; 



                                                        <br/><70 



                                                        mg/dL for 



                                                        patients with 



                                                        CHD or  



                                                        diabetic 



                                                        patients 



                                                        <br/>with > 



                                                        or = 2 CHD 



                                                        risk    



                                                        factors.<br/> 



                                                        <br/>LDL-C is 



                                                        now     



                                                        calculated 



                                                        using the 



                                                        Janelle 



                                                        s       



                                                        <br/>calculat 



                                                        ion, which is 



                                                        a validated 



                                                        novel method 



                                                        providing 



                                                        <br/>better 



                                                        accuracy than 



                                                        the     



                                                        Friedewald 



                                                        equation in 



                                                        the     



                                                        <br/>estimati 



                                                        on of LDL-C. 



                                                        <br/>Joss 



                                                        SS et al. 



                                                        GIOVANI.   



                                                        2013;310(19): 



                                                        [id number] 



                                                        <br/>(http:// 



                                                        education.[employer]ling[employer]/faq/FAQ1 



                                                        64)     



                                                                

 

        LIPIDS  CHD Risk 4.2     <5.0                        Medical



                                (CALC)                     Group



                                                                



                                                                



                                                                



                                                                

 

        LIPIDS  Non HDL Chol 125     <130 mg/dL            Result   Medic

al



                                                   Comment: For Group



                                                        patients with 



                                                        diabetes plus 



                                                        1 major ASCVD 



                                                        risk    



                                                        <br/>factor, 



                                                        treating to a 



                                                        non-HDL-C 



                                                        goal of <100 



                                                        mg/dL   



                                                        <br/>(LDL-C 



                                                        of <70 mg/dL) 



                                                        is considered 



                                                        a therapeutic 



                                                        <br/>option. 



                                                                



                                                                

 

        SPECIAL Hgb A1C 6.1     <5.7 %             Result   Medical



        CHEMISTRY                                    Comment: For Group



                                                        someone 



                                                        without known 



                                                        diabetes, a 



                                                        hemoglobin 



                                                        <br/>A1c 



                                                        value between 



                                                        5.7% and 6.4% 



                                                        is consistent 



                                                        with<br/>pred 



                                                        iabetes and 



                                                        should be 



                                                        confirmed 



                                                        with a  



                                                        <br/>follow-u 



                                                        p test.<br/> 



                                                        <br/>For 



                                                        someone with 



                                                        known   



                                                        diabetes, a 



                                                        value   



                                                        <7%<br/>indic 



                                                        ates that 



                                                        their   



                                                        diabetes is 



                                                        well    



                                                        controlled. 



                                                        A1c<br/>targe 



                                                        ts should be 



                                                        individualize 



                                                        d based on 



                                                        duration 



                                                        of<br/>diabet 



                                                        es, age, 



                                                        comorbid 



                                                        conditions, 



                                                        and     



                                                        other<br/>con 



                                                        siderations.< 



                                                        br/>    



                                                        &lt;br/>This 



                                                        assay result 



                                                        is consistent 



                                                        with an 



                                                        increased 



                                                        risk<br/>of 



                                                        diabetes.<br/ 



                                                        >       



                                                        <br/>Currentl 



                                                        y, no   



                                                        consensus 



                                                        exists  



                                                        regarding use 



                                                        of<br/>hemogl 



                                                        obin A1c for 



                                                        diagnosis of 



                                                        diabetes for 



                                                        children.<br/ 



                                                        > <br/> 



                                                        <br/>Lab test 



                                                        performed 



                                                        by:<br/>[facility]  



                                                        Lab<br/>[address] 



                                                        [facility]<br/>[place] 



                                                        [id number]<br 



                                                        />[facility] 



                                                                



                                                                

 

        CHEM PANEL Phosphorus 3.1     2.5 - 4.5                    [facility]



                                                                



                                                                



                                                                



                                                                

 

        CHEM PANEL Magnesium 2.0     1.8 - 2.4                    



                Lvl                     /                   [facility]



                                                                



                                                                



                                                                



                                                                

 

        CHEM PANEL eGFR    58                         Result             Comment: The [facility]



                                                        eGFR is 



                                                        calculated 



                                                        using the 



                                                        CKD-EPI 



                                                        formula. In 



                                                        most young, 



                                                        healthy 



                                                        individuals 



                                                        the eGFR will 



                                                        be >90  



                                                        mL/min/1.73m2 



                                                        . The eGFR 



                                                        declines with 



                                                        age. An eGFR 



                                                        of 60-89 may 



                                                        be normal in 



                                                        some    



                                                        populations, 



                                                        particularly 



                                                        the elderly, 



                                                        for whom the 



                                                        CKD-EPI 



                                                        formula has 



                                                        not been 



                                                        extensively 



                                                        validated. 



                                                        Use of the 



                                                        eGFR is not 



                                                        recommended 



                                                        in the  



                                                        following 



                                                        populations:< 



                                                        br/><br/>[place] 



                                                        viduals with 



                                                        unstable 



                                                        creatinine 



                                                        concentration 



                                                        s, including 



                                                        pregnant 



                                                        patients and 



                                                        those with 



                                                        serious 



                                                        co-morbid 



                                                        conditions.<b 



                                                        r/><br/>Patie 



                                                        nts with 



                                                        extremes in 



                                                        muscle mass 



                                                        or diet. 



                                                        <br/><br/>The 



                                                        data above 



                                                        are obtained 



                                                        from the 



                                                        [employer] 



                                                        [place]  



                                                        Disease 



                                                        Education 



                                                        Program 



                                                        (NKDEP) which 



                                                        additionally 



                                                        recommends 



                                                        that when the 



                                                        eGFR is used 



                                                        in patients 



                                                        with extremes 



                                                        of body mass 



                                                        index for 



                                                        purposes of 



                                                        drug dosing, 



                                                        the eGFR 



                                                        should be 



                                                        multiplied by 



                                                        the estimated 



                                                        BMI.    



                                                                

 

        CHEM PANEL Potassium 3.9     3.5 - 5.1                    [facility]



                Lvl                     /                   [facility]



                                                                



                                                                



                                                                



                                                                

 

        CHEM PANEL Sodium Lvl 136     135 - 145                    [facility]



                                                                



                                                                



                                                                



                                                                

 

        CHEM PANEL Creatinine 1.30    0.50 -                     



                Lvl             1.40    /                   [facility]



                                                                



                                                                



                                                                



                                                                

 

        CHEM PANEL Glucose Lvl 145     70 - 99                    [facility]



                                                                



                                                                



                                                                



                                                                

 

        CHEM PANEL Chloride Lvl 102     95 - 109                    [facility]



                                                                



                                                                



                                                                



                                                                

 

        CHEM PANEL AGAP    15.9    10.0 -                     [facility]



                                20.0                       [facility]



                                                                



                                                                



                                                                



                                                                

 

        CHEM PANEL CO2     22      24 - 32                    [facility]



                                                                



                                                                



                                                                



                                                                

 

        CHEM PANEL Calcium Lvl 9.1     8.5 - 10.5                    [facility]



                                                                



                                                                



                                                                



                                                                

 

        CHEM PANEL BUN     18      7 - 22                     [facility]



                                                                



                                                                



                                                                



                                                                

 

        HEMATOLOGY WBC     11.5    3.7 - 10.4                    [facility]



                                                                



                                                                



                                                                



                                                                

 

        HEMATOLOGY Hgb     12.5    14.0 -                     [facility]



                                18.0                       [facility]



                                                                



                                                                



                                                                



                                                                

 

        HEMATOLOGY RBC     5.51    4.70 -                     [facility]



                                6.10    /                   [facility]



                                                                



                                                                



                                                                



                                                                

 

        HEMATOLOGY MCHC    32.2    32.0 -                     [facility]



                                36.0                       [facility]



                                                                



                                                                



                                                                



                                                                

 

        HEMATOLOGY MCH     22.7    27.0 -                     [facility]



                                31.0                       [facility]



                                                                



                                                                



                                                                



                                                                

 

        HEMATOLOGY RDW     17.2    11.5 -                     [facility]



                                14.5    /                   [facility]



                                                                



                                                                



                                                                



                                                                

 

        HEMATOLOGY MCV     70.5    80.0 -                     [facility]



                                94.0                       [facility]



                                                                



                                                                



                                                                



                                                                

 

        HEMATOLOGY Hct     38.9    42.0 -  03                   [facility]



                                54.0    /2018                   [facility]



                                                                



                                                                



                                                                



                                                                

 

        HEMATOLOGY MPV     8.3     7.4 - 10.4 



                                        /                   [facility]



                                                                



                                                                



                                                                



                                                                

 

        HEMATOLOGY Platelet 371     133 - 450                    [facility]



                                        /                   [facility]



                                                                



                                                                



                                                                



                                                                

 

        PARATHYROID Ca Norm WB 1.16    1.05 -  03                   [facility]



        PROFILE                 1.                   [facility]



                                                                



                                                                



                                                                



                                                                

 

        PARATHYROID Ca Ion WB 1.14    1.05 -                     [facility]



        PROFILE                 1.                   [facility]



                                                                



                                                                



                                                                



                                                                

 

        CARDIAC Total      12 - 191 /                   [facility]



        ENZYMES                         /                   [facility]



                                                                



                                                                



                                                                



                                                                

 

        CARDIAC Troponin-I 7.18    0.00 -  0302           Result  [facility]



        ENZYMES                 0.40    /2018           Comment: [facility]



                                                        Critical 



                                                        Result(s) 



                                                        called to 



                                                        Irish maldonado2018 10:18 at 



                                                        _ by PF. 



                                                        Read back OK. 



                                                                



                                                                

 

        CARDIAC CK MB Index 4.8     0.0 - 2.5                    [facility]



        ENZYMES                         /                   [facility]



                                                                



                                                                



                                                                



                                                                

 

        CARDIAC CK MB   12.4    0.5 - 3.6                    [facility]



        ENZYMES                         /                   [facility]



                                                                



                                                                



                                                                



                                                                

 

        ANEMIA  Vitamin B12 278     254 - 1320                    



        STUDY   Lvl                     /                   [facility]



                                                                



                                                                



                                                                



                                                                

 

        ANEMIA  Ferritin Lvl 13      22 - 275                    [facility]



        STUDY                           /                   [facility]



                                                                



                                                                



                                                                



                                                                

 

        CARDIAC Troponin-I 9.18    0.00 -  03           Result  [facility]



        ENZYMES                 0.40               Comment: [facility]



                                                        Critical 



                                                        Result(s) 



                                                        called to DANIEL Hwang at 



                                                        2018 



                                                        02:52 by DB. 



                                                        Read back OK. 



                                                                



                                                                

 

        CARDIAC CK MB   17.7    0.5 - 3.6                    [facility]



        ENZYMES                         /                   [facility]



                                                                



                                                                



                                                                



                                                                

 

        CHEM PANEL Magnesium 2.0     1.8 - 2.4                    [facility]



                Lvl                     /                   [facility]



                                                                



                                                                



                                                                



                                                                

 

        CHEM PANEL Phosphorus 2.0     2.5 - 4.5                    [facility]



                                        /                   [facility]



                                                                



                                                                



                                                                



                                                                

 

        CHEM PANEL Creatinine 1.10    0.50 -  03                   [facility]



                Lvl             1.40    /                   [facility]



                                                                



                                                                



                                                                



                                                                

 

        CHEM PANEL eGFR    71                         Result             Comment: The [facility]



                                                        eGFR is 



                                                        calculated 



                                                        using the 



                                                        CKD-EPI 



                                                        formula. In 



                                                        most young, 



                                                        healthy 



                                                        individuals 



                                                        the eGFR will 



                                                        be >90  



                                                        mL/min/1.73m2 



                                                        . The eGFR 



                                                        declines with 



                                                        age. An eGFR 



                                                        of 60-89 may 



                                                        be normal in 



                                                        some    



                                                        populations, 



                                                        particularly 



                                                        the elderly, 



                                                        for whom the 



                                                        CKD-EPI 



                                                        formula has 



                                                        not been 



                                                        extensively 



                                                        validated. 



                                                        Use of the 



                                                        eGFR is not 



                                                        recommended 



                                                        in the  



                                                        following 



                                                        populations:< 



                                                        br/><br/>[place] 



                                                        viduals with 



                                                        unstable 



                                                        creatinine 



                                                        concentration 



                                                        s, including 



                                                        pregnant 



                                                        patients and 



                                                        those with 



                                                        serious 



                                                        co-morbid 



                                                        conditions.<b 



                                                        r/><br/>Patie 



                                                        nts with 



                                                        extremes in 



                                                        muscle mass 



                                                        or diet. 



                                                        <br/><br/>The 



                                                        data above 



                                                        are obtained 



                                                        from the 



                                                        [employer] 



                                                        [place]  



                                                        Disease 



                                                        Education 



                                                        Program 



                                                        (NKDEP) which 



                                                        additionally 



                                                        recommends 



                                                        that when the 



                                                        eGFR is used 



                                                        in patients 



                                                        with extremes 



                                                        of body mass 



                                                        index for 



                                                        purposes of 



                                                        drug dosing, 



                                                        the eGFR 



                                                        should be 



                                                        multiplied by 



                                                        the estimated 



                                                        BMI.    



                                                                

 

        ELECTROLYTE Sodium Lvl 137     135 - 145                    [facility]



        S                                                  [facility]



                                                                



                                                                



                                                                



                                                                

 

        ELECTROLYTE Creatinine 1.10    0.50 -  03                   [facility]



        S       Lvl             1.40    /2018                   [facility]



                                                                



                                                                



                                                                



                                                                

 

        ELECTROLYTE BUN     13      7 - 22                     [facility]



        S                               /                   [facility]



                                                                



                                                                



                                                                



                                                                

 

        ELECTROLYTE Chloride Lvl 105     95 - 109                    [facility]



        S                                                  [facility]



                                                                



                                                                



                                                                



                                                                

 

        ELECTROLYTE AGAP    12.5    10.0 -  03                   [facility]



        S                       20.0    /                   [facility]



                                                                



                                                                



                                                                



                                                                

 

        ELECTROLYTE Calcium Lvl 9.2     8.5 - 10.5                    [facility]



        S                                                  [facility]



                                                                



                                                                



                                                                



                                                                

 

        ELECTROLYTE CO2     24      24 - 32                    [facility]



        S                                                  [facility]



                                                                



                                                                



                                                                



                                                                

 

        ELECTROLYTE Potassium 4.5     3.5 - 5.1                    [facility]



        S       Lvl                     /                   [facility]



                                                                



                                                                



                                                                



                                                                

 

        ELECTROLYTE eGFR    71                         Result  [facility]



        S                                          Comment: The [facility]



                                                        eGFR is 



                                                        calculated 



                                                        using the 



                                                        CKD-EPI 



                                                        formula. In 



                                                        most young, 



                                                        healthy 



                                                        individuals 



                                                        the eGFR will 



                                                        be >90  



                                                        mL/min/1.73m2 



                                                        . The eGFR 



                                                        declines with 



                                                        age. An eGFR 



                                                        of 60-89 may 



                                                        be normal in 



                                                        some    



                                                        populations, 



                                                        particularly 



                                                        the elderly, 



                                                        for whom the 



                                                        CKD-EPI 



                                                        formula has 



                                                        not been 



                                                        extensively 



                                                        validated. 



                                                        Use of the 



                                                        eGFR is not 



                                                        recommended 



                                                        in the  



                                                        following 



                                                        populations:< 



                                                        br/><br/>[place] 



                                                        viduals with 



                                                        unstable 



                                                        creatinine 



                                                        concentration 



                                                        s, including 



                                                        pregnant 



                                                        patients and 



                                                        those with 



                                                        serious 



                                                        co-morbid 



                                                        conditions.<b 



                                                        r/><br/>Patie 



                                                        nts with 



                                                        extremes in 



                                                        muscle mass 



                                                        or diet. 



                                                        <br/><br/>The 



                                                        data above 



                                                        are obtained 



                                                        from the 



                                                        [employer] 



                                                        [place]  



                                                        Disease 



                                                        Education 



                                                        Program 



                                                        (NKDEP) which 



                                                        additionally 



                                                        recommends 



                                                        that when the 



                                                        eGFR is used 



                                                        in patients 



                                                        with extremes 



                                                        of body mass 



                                                        index for 



                                                        purposes of 



                                                        drug dosing, 



                                                        the eGFR 



                                                        should be 



                                                        multiplied by 



                                                        the estimated 



                                                        BMI.    



                                                                

 

        ELECTROLYTE Glucose Lvl 131     70 - 99                    [facility]



        S                                                  [facility]



                                                                



                                                                



                                                                



                                                                

 

        HEMATOLOGY Retic Auto 1.4     0.5 - 1.5                    [facility]



                                                                



                                                                



                                                                



                                                                

 

        HEMATOLOGY MCV     70.6    80.0 -                     [facility]



                                94.0                       [facility]



                                                                



                                                                



                                                                



                                                                

 

        HEMATOLOGY RBC     5.33    4.70 -                     [facility]



                                6.10    /                   [facility]



                                                                



                                                                



                                                                



                                                                

 

        HEMATOLOGY RDW     17.4    11.5 -  03/                   [facility]



                                14.5    /                   [facility]



                                                                



                                                                



                                                                



                                                                

 

        HEMATOLOGY WBC     10.0    3.7 - 10.4 03/



                                        /                   [facility]



                                                                



                                                                



                                                                



                                                                

 

        HEMATOLOGY Hgb     12.4    14.0 -  03/                   [facility]



                                18.0    /                   [facility]



                                                                



                                                                



                                                                



                                                                

 

        HEMATOLOGY MPV     8.5     7.4 - 10.4 /                   [facility]



                                                                



                                                                



                                                                



                                                                

 

        HEMATOLOGY Platelet 376     133 - 450 03/                   [facility]



                                                           [facility]



                                                                



                                                                



                                                                



                                                                

 

        HEMATOLOGY Hct     37.6    42.0 -  03/                   [facility]



                                54.0    /                   [facility]



                                                                



                                                                



                                                                



                                                                

 

        HEMATOLOGY MCHC    33.0    32.0 -  03/                   [facility]



                                36.0    /                   [facility]



                                                                



                                                                



                                                                



                                                                

 

        HEMATOLOGY MCH     23.3    27.0 -  03/                   [facility]



                                31.0    /                   [facility]



                                                                



                                                                



                                                                



                                                                

 

        PARATHYROID Ca Ion WB 1.11    1.05 -                     [facility]



        PROFILE                                    [facility]



                                                                



                                                                



                                                                



                                                                

 

        PARATHYROID Ca Norm WB 1.12    1.05 -                     [facility]



        PROFILE                                    [facility]



                                                                



                                                                



                                                                



                                                                

 

        CHEM PANEL Phosphorus 2.1     2.5 - 4.5                    [facility]



                                                                



                                                                



                                                                



                                                                

 

        CHEM PANEL Magnesium 1.8     1.8 - 2.4                    [facility]



                Lvl                                        [facility]



                                                                



                                                                



                                                                



                                                                

 

        ELECTROLYTE Sodium Lvl 133     135 - 145 /                   [facility]



        S                                                  [facility]



                                                                



                                                                



                                                                



                                                                

 

        ELECTROLYTE Potassium 4.6     3.5 - 5.1                    [facility]



        S       Lvl                                        [facility]



                                                                



                                                                



                                                                



                                                                

 

        ELECTROLYTE Chloride Lvl 103     95 - 109 



        S                                                  [facility]



                                                                



                                                                



                                                                



                                                                

 

        ELECTROLYTE Glucose Lvl 272     70 - 99 



        S                                                  [facility]



                                                                



                                                                



                                                                



                                                                

 

        ELECTROLYTE BUN     13      7 - 22  



        S                                                  [facility]



                                                                



                                                                



                                                                



                                                                

 

        ELECTROLYTE ALT     41      0 - 65  



        S                                                  [facility]



                                                                



                                                                



                                                                



                                                                

 

        ELECTROLYTE AST     54      0 - 37  



        S                                                  [facility]



                                                                



                                                                



                                                                



                                                                

 

        ELECTROLYTE Alk Phos 67      39 - 136 



        S                                                  [facility]



                                                                



                                                                



                                                                



                                                                

 

        ELECTROLYTE Bili Total 0.3     0.2 - 1.3 



        S                                                  [facility]



                                                                



                                                                



                                                                



                                                                

 

        ELECTROLYTE Calcium Lvl 8.3     8.5 - 10.5                    [facility]



        S                                                  [facility]



                                                                



                                                                



                                                                



                                                                

 

        ELECTROLYTE Total   6.6     6.4 - 8.4 /                   [facility]



        S       Protein                 /2018                   [facility]



                                                                



                                                                



                                                                



                                                                

 

        ELECTROLYTE Albumin Lvl 3.3     3.5 - 5.0 /



        S                                                  [facility]



                                                                



                                                                



                                                                



                                                                

 

        ELECTROLYTE CO2     19      24 - 32 03/                   [facility]



        S                                                  [facility]



                                                                



                                                                



                                                                



                                                                

 

        ELECTROLYTE AGAP    15.6    10.0 -  03/                   [facility]



        S                       20.0    /                   [facility]



                                                                



                                                                



                                                                



                                                                

 

        ELECTROLYTE B/C Ratio 13      6 - 25  /                   [facility]



        S                               /                   [facility]



                                                                



                                                                



                                                                



                                                                

 

        ELECTROLYTE Globulin 3.3     2.7 - 4.2 03/



        S                               /                   [facility]



                                                                



                                                                



                                                                



                                                                

 

        ELECTROLYTE A/G Ratio 1.0     0.7 - 1.6 03/



        S                               /                   [facility]



                                                                



                                                                



                                                                



                                                                

 

        HEMATOLOGY Eosinophils 0.3     0.0 - 4.0 03/



                                        /                   [facility]



                                                                



                                                                



                                                                



                                                                

 

        HEMATOLOGY Lymphocytes 14.3    20.0 -  03/                   [facility]



                                40.0    /                   [facility]



                                                                



                                                                



                                                                



                                                                

 

        HEMATOLOGY Monocytes 4.2     2.0 - 12.0 03/                   [facility]



                                                                



                                                                



                                                                



                                                                

 

        HEMATOLOGY Segs    80.4    45.0 -  03/                   



                                75.0    /                   [facility]



                                                                



                                                                



                                                                



                                                                

 

        HEMATOLOGY Basophils # 0.1     0.0 - 0.2 03                   [facility]



                                                                



                                                                



                                                                



                                                                

 

        HEMATOLOGY Microcyte 2+      None Seen                    



                        *ABN*           /                   [facility]



                        (3/1/18 12:12 AM)                                 



                                                                



                                                                



                                                                

 

        HEMATOLOGY Monocytes # 0.4     0.0 - 0.8                    [facility]



                                                                



                                                                



                                                                



                                                                

 

        HEMATOLOGY Lymphocytes 1.5     1.0 - 5.5 



                #                       /                   [facility]



                                                                



                                                                



                                                                



                                                                

 

        HEMATOLOGY Eosinophils 0.0     0.0 - 0.5 /



                #                       /                   [facility]



                                                                



                                                                



                                                                



                                                                

 

        HEMATOLOGY Basophils 0.8     0.0 - 1.0 03                   [facility]



                                                                



                                                                



                                                                



                                                                

 

        HEMATOLOGY Segs-Bands # 8.6     1.5 - 8.1                    [facility]



                                                                



                                                                



                                                                



                                                                

 

        HEMATOLOGY POC     104                                



                Activated                 /2018                   [facility]



                Clotting                                         



                Time                                            



                                                                



                                                                

 

        HEMATOLOGY MCHC    32.3    32.0 -  03                   



                                36.0    /                   [facility]



                                                                



                                                                



                                                                



                                                                

 

        HEMATOLOGY MPV     8.3     7.4 - 10.4                    [facility]



                                                                



                                                                



                                                                



                                                                

 

        HEMATOLOGY RBC     5.09    4.70 -                     



                                6.10    /                   [facility]



                                                                



                                                                



                                                                



                                                                

 

        HEMATOLOGY Platelet 342     133 - 450 03                   [facility]



                                                                



                                                                



                                                                



                                                                

 

        HEMATOLOGY RDW     16.4    11.5 -  03                   



                                14.5    /                   [facility]



                                                                



                                                                



                                                                



                                                                

 

        HEMATOLOGY MCH     22.6    27.0 -  03/                   



                                31.0    /                   [facility]



                                                                



                                                                



                                                                



                                                                

 

        HEMATOLOGY Hct     35.6    42.0 -  03/                   



                                54.0    /                   [facility]



                                                                



                                                                



                                                                



                                                                

 

        HEMATOLOGY MCV     70.0    80.0 -  03/                   



                                94.0    /                   [facility]



                                                                



                                                                



                                                                



                                                                

 

        HEMATOLOGY Hgb     11.5    14.0 -  03                   



                                18.0    /                   [facility]



                                                                



                                                                



                                                                



                                                                

 

        HEMATOLOGY WBC     10.8    3.7 - 10.4 03/                   [facility]



                                                                



                                                                



                                                                



                                                                

 

        HEMATOLOGY INR     0.94    0.85 -  03                   



                                1.17                       [facility]



                                                                



                                                                



                                                                



                                                                

 

        HEMATOLOGY PT      12.6    12.0 -  03                   [facility]



                                14.                   [facility]



                                                                



                                                                



                                                                



                                                                

 

        HEMATOLOGY PTT     28.8    22.9 -  03                   [facility]



                                35.8    /                   [facility]



                                                                



                                                                



                                                                



                                                                

 

        PARATHYROID Ca Norm WB 0.99    1.05 -                     



        PROFILE                                    [facility]



                                                                



                                                                



                                                                



                                                                

 

        PARATHYROID Ca Ion WB 0.97    1.05 -                     



        PROFILE                                    [facility]



                                                                



                                                                



                                                                



                                                                

 

        SPECIAL Hgb A1C 9.3     <=5.6 %                    



        CHEMISTRY                                            [facility]



                                                                



                                                                



                                                                



                                                                

 

        BACTERIAL - MRSA by PCR Negative                            



        SEROLOGY         (18 5:48 PM)         /                   [place]

[place]



                                                                



                                                                



                                                                



                                                                

 

        HEMATOLOGY Segs    61.8    45.0 -                     [facility]



                                75.0                       [facility]



                                                                



                                                                



                                                                



                                                                

 

        HEMATOLOGY Monocytes 6.7     2.0 - 12.0                    [facility]



                                                                



                                                                



                                                                



                                                                

 

        HEMATOLOGY Lymphocytes 28.6    20.0 -                     



                                40.0                       [facility]



                                                                



                                                                



                                                                



                                                                

 

        HEMATOLOGY Lymphocytes 2.1     1.0 - 5.5                    [facility]



                #                       /                   [facility]



                                                                



                                                                



                                                                



                                                                

 

        HEMATOLOGY Basophils # 0.0     0.0 - 0.2                    [facility]



                                                                



                                                                



                                                                



                                                                

 

        HEMATOLOGY Eosinophils 0.2     0.0 - 0.5                    



                #                       /                   [facility]



                                                                



                                                                



                                                                



                                                                

 

        HEMATOLOGY Monocytes # 0.5     0.0 - 0.8                    [facility]



                                                                



                                                                



                                                                



                                                                

 

        HEMATOLOGY Microcyte 2+      None Seen                    



                        *ABN*           /                   [facility]



                        (18 5:48 PM)                                 



                                                                



                                                                



                                                                

 

        HEMATOLOGY Basophils 0.5     0.0 - 1.0                    [facility]



                                                                



                                                                



                                                                



                                                                

 

        HEMATOLOGY Eosinophils 2.4     0.0 - 4.0                    [facility]



                                                                



                                                                



                                                                



                                                                

 

        HEMATOLOGY Segs-Bands # 4.6     1.5 - 8.1                    [facility]



                                                                



                                                                



                                                                



                                                                

 

        HEMATOLOGY PT      17.8    12.0 -                     



                                14.7                       [facility]



                                                                



                                                                



                                                                



                                                                

 

        HEMATOLOGY INR     1.46    0.85 -                     



                                1.                   [facility]



                                                                



                                                                



                                                                



                                                                

 

        HEMATOLOGY PTT     53.0    22.9 -                     



                                35.8                       [facility]



                                                                



                                                                



                                                                



                                                                

 

        CHEM PANEL eGFR    53                         Result   [facility]



                                        /2015           Comment: [facility] [place]



                                                        eGFR is 



                                                        calculated 



                                                        using the 



                                                        CKD-EPI 



                                                        formula. In 



                                                        most young, 



                                                        healthy 



                                                        individuals 



                                                        the eGFR will 



                                                        be >90  



                                                        mL/min/1.73m2 



                                                        . The eGFR 



                                                        declines with 



                                                        age. An eGFR 



                                                        of 60-89 may 



                                                        be normal in 



                                                        some    



                                                        populations, 



                                                        particularly 



                                                        the elderly, 



                                                        for whom the 



                                                        CKD-EPI 



                                                        formula has 



                                                        not been 



                                                        extensively 



                                                        validated. 



                                                        Use of the 



                                                        eGFR is not 



                                                        recommended 



                                                        in the  



                                                        following 



                                                        populations:< 



                                                        br/><br/>[place] 



                                                        viduals with 



                                                        unstable 



                                                        creatinine 



                                                        concentration 



                                                        s, including 



                                                        pregnant 



                                                        patients and 



                                                        those with 



                                                        serious 



                                                        co-morbid 



                                                        conditions.<b 



                                                        r/><br/>[place] 



                                                        nts with 



                                                        extremes in 



                                                        muscle mass 



                                                        or diet. 



                                                        <br/><br/>The 



                                                        data above 



                                                        are obtained 



                                                        from the 



                                                        [employer] 



                                                        [place]  



                                                        Disease 



                                                        Education 



                                                        Program 



                                                        (NKDEP) which 



                                                        additionally 



                                                        recommends 



                                                        that when the 



                                                        eGFR is used 



                                                        in patients 



                                                        with extremes 



                                                        of body mass 



                                                        index for 



                                                        purposes of 



                                                        drug dosing, 



                                                        the eGFR 



                                                        should be 



                                                        multiplied by 



                                                        the estimated 



                                                        BMI.    



                                                                

 

        CHEM PANEL Calcium Lvl 9.1     8.5 - 10.5                     [facility]

                   [facility]



                                                                



                                                                



                                                                



                                                                

 

        CHEM PANEL AGAP    11.3    10.0 -                      [facility]



                                20.0                       [facility]



                                                                



                                                                



                                                                



                                                                

 

        CHEM PANEL Chloride Lvl 105     95 - 109                     [facility]

r



                                                           [facility]



                                                                



                                                                



                                                                



                                                                

 

        CHEM PANEL CO2     27      24 - 32                     [facility]



                                                           [facility]



                                                                



                                                                



                                                                



                                                                

 

        CHEM PANEL BUN     13      7 - 22                      [facility]



                                                           [facility]



                                                                



                                                                



                                                                



                                                                

 

        CHEM PANEL Glucose Lvl 185     70 - 99                     [facility]



                                                           [facility]



                                                                



                                                                



                                                                



                                                                

 

        CHEM PANEL Potassium 4.3     3.5 - 5.1                     [facility]



                                   [facility]



                                                                



                                                                



                                                                



                                                                

 

        CHEM PANEL Sodium Lvl 139     135 - 145                     [facility]



                                        /2015                   [facility]



                                                                



                                                                



                                                                



                                                                

 

        CHEM PANEL Creatinine 1.4     0.5 - 1.4                     [facility]



                                   [facility]



                                                                



                                                                



                                                                



                                                                

 

        Chemistry HGBA1C  7.4      - 5.6                      [facility]



                                        /2015                   [facility]



                                                                



                                                                



                                                                



                                                                

 

        Chemistry TSH     1.750   0.360 -                     [facility]



                                3.740   /                   Group



                                                                



                                                                



                                                                



                                                                

 

        Chemistry CHOLESTEROL 176      - 199                      [facility]



                                        /2015                   [facility]



                                                                



                                                                



                                                                



                                                                

 

        Chemistry TRIGLYCERIDE 300      - 149                      [facility]

[facility]



                                        /2015                   [facility]



                                                                



                                                                



                                                                



                                                                

 

        Chemistry HDL     36      >=61                        [facility]



                                        /2015                   [facility]



                                                                



                                                                



                                                                



                                                                

 

        Chemistry LDL     80       - 99                       [facility]



                                        /2015                   [facility]



                                                                



                                                                



                                                                



                                                                

 

        Chemistry URIC ACID 4.2     3.8 - 8.0                     [facility]



                                        /2015                   [facility]



                                                                



                                                                



                                                                



                                                                

 

        Chemistry SODIUM  141 MEQ/L 135 - 145                     [facility]



                                        /2015                   [facility]



                                                                



                                                                



                                                                



                                                                

 

        Chemistry POTASSIUM 5.0 MEQ/L 3.5 - 5.1                     [facility]

al



                                        /2015                   [facility]



                                                                



                                                                



                                                                



                                                                

 

        Chemistry CREATININE 1.3     0.5 - 1.4                     [facility]

[facility]



                                        /2015                   [facility]



                                                                



                                                                



                                                                



                                                                

 

        Chemistry BUN     15      7 - 22                      [facility]



                                        /2015                   [facility]



                                                                



                                                                



                                                                



                                                                

 

        Chemistry BUN/CREAT 12      6 - 25                      [facility]



                                        /2015                   [facility]



                                                                



                                                                



                                                                



                                                                

 

        Chemistry ALBUMIN 4.1     3.5 - 5.0                     [facility]



                                        /2015                   [facility]



                                                                



                                                                



                                                                



                                                                

 

        Chemistry CALCIUM 8.5     8.5 - 10.5                     [facility]



                                        /2015                   [facility]



                                                                



                                                                



                                                                



                                                                

 

        Chemistry SGPT (ALT) 48      0 - 65                      [facility]



                                        /2015                   [facility]



                                                                



                                                                



                                                                



                                                                

 

        Chemistry SGOT (AST) 31      0 - 37  08                    [facility]



                                        /2015                   [facility]



                                                                



                                                                



                                                                



                                                                

 

        Chemistry ALK PHOS 59      39 - 136 08                    [facility]



                                        /2015                   Group



                                                                



                                                                



                                                                



                                                                

 

        Chemistry PSA     0.43    0.00 -  08                    Medical



                                4.00    /                   Group



                                                                



                                                                



                                                                



                                                                

 

        Hematology HGB     14.0    14.0 -  08                    Medical



                                18.0    /                   Group



                                                                



                                                                



                                                                



                                                                

 

        Hematology HCT     42.6    42.0 -  08                    Medical



                                54.0    /                   Group



                                                                



                                                                



                                                                



                                                                

 

        Hematology PLATELETS 181 K/ - 450                    [facility]

                   Group



                                                                



                                                                



                                                                



                                                                

 

        Chemistry SODIUM  139     135 - 143                     Medical



                                        /2015                   Group



                                                                



                                                                



                                                                



                                                                

 

        Chemistry POTASSIUM 4.8     3.3 - 5.0                     Medical



                                        /2015                   Group



                                                                



                                                                



                                                                



                                                                

 

        Chemistry BUN     12      10 -                     Medical



                                        /2015                   Group



                                                                



                                                                



                                                                



                                                                

 

        Chemistry CREATININE 1.06    0.46 -                      Medical



                                1.20                       Group



                                                                



                                                                



                                                                



                                                                

 

        Chemistry CALCIUM 9.5     8.6 - 9.8                     Medical



                                        /2015                   Group



                                                                



                                                                



                                                                



                                                                

 

        Chemistry CHOLESTEROL 183     120 - 200                     Medic

al



                                        /2015                   Group



                                                                



                                                                



                                                                



                                                                

 

        Chemistry HDL     43      26 - 62                     Medical



                                        /2015                   Group



                                                                



                                                                



                                                                



                                                                

 

        Chemistry LDL     79      0 - 130                     Medical



                                        /2015                   Group



                                                                



                                                                



                                                                



                                                                

 

        Chemistry SGPT (ALT) 55      11 - 43                     Medical



                                        /2015                   Group



                                                                



                                                                



                                                                



                                                                

 

        Chemistry SGOT (AST) 40      10 - 42                     Medical



                                        /2015                   Group



                                                                



                                                                



                                                                



                                                                

 

        Chemistry URIC ACID 4.9     4.0 - 8.4                     Medical



                                        /2015                   Group



                                                                



                                                                



                                                                



                                                                

 

        Chemistry HGBA1C  7.6     3.0 - 6.0                     Medical



                                        /2015                   Group



                                                                



                                                                



                                                                



                                                                

 

        Chemistry SODIUM  139     135 - 143                     Medical



                                        /2015                   Group



                                                                



                                                                



                                                                



                                                                

 

        Chemistry POTASSIUM 4.8     3.3 - 5.0                     Medical



                                        /2015                   Group



                                                                



                                                                



                                                                



                                                                

 

        Chemistry BUN     12      10 -                     Medical



                                        /2015                   Group



                                                                



                                                                



                                                                



                                                                

 

        Chemistry SODIUM  140     135 - 143                     Medical



                                        /2014                   Group



                                                                



                                                                



                                                                



                                                                

 

        Chemistry POTASSIUM 4.8     3.3 - 5.0                     Medical



                                        /2014                   Group



                                                                



                                                                



                                                                



                                                                

 

        Chemistry BUN     14      10 -                     Medical



                                        /2014                   Group



                                                                



                                                                



                                                                



                                                                

 

        Chemistry SODIUM  140     135 - 143                     Medical



                                        /2014                   Group



                                                                



                                                                



                                                                



                                                                

 

        Chemistry POTASSIUM 4.8     3.3 - 5.0                     Medical



                                        /2014                   Group



                                                                



                                                                



                                                                



                                                                

 

        Chemistry BUN     14      10 -                     Medical



                                        /2014                   Group



                                                                



                                                                



                                                                



                                                                

 

        Chemistry CREATININE 1.01    0.46 -  08                    Medical



                                .                   Group



                                                                



                                                                



                                                                



                                                                

 

        Chemistry CALCIUM 9.7     8.6 - 9.8                     Medical



                                        /2014                   Group



                                                                



                                                                



                                                                



                                                                

 

        Chemistry CHOLESTEROL 179     120 - 200                     Medic

                   Group



                                                                



                                                                



                                                                



                                                                

 

        Chemistry HDL     41      26 - 62                     Medical



                                        /2014                   Group



                                                                



                                                                



                                                                



                                                                

 

        Chemistry LDL     80      0 - 130                     Medical



                                        /2014                   Group



                                                                



                                                                



                                                                



                                                                

 

        Chemistry SGPT (ALT) 42       -                     Medical



                                        /2014                   Group



                                                                



                                                                



                                                                



                                                                

 

        Chemistry SODIUM  140     135 - 143                     Medical



                                        /2014                   Group



                                                                



                                                                



                                                                



                                                                

 

        Chemistry POTASSIUM 4.8     3.3 - 5.0                     Medical



                                        /2014                   Group



                                                                



                                                                



                                                                



                                                                

 

        Chemistry BUN     14      10 -                     Medical



                                        /2014                   Group



                                                                



                                                                



                                                                



                                                                

 

        Chemistry SODIUM  140     135 - 143                     Medical



                                        /2014                   Group



                                                                



                                                                



                                                                



                                                                

 

        Chemistry POTASSIUM 4.8     3.3 - 5.0                     Medical



                                        /2014                   Group



                                                                



                                                                



                                                                



                                                                

 

        Chemistry BUN     14      10 -                     Medical



                                        /2014                   Group



                                                                



                                                                



                                                                



                                                                

 

        Chemistry CREATININE 1.01    0.46 -                      Medical



                                .                   Group



                                                                



                                                                



                                                                



                                                                

 

        Chemistry CALCIUM 9.7     8.6 - 9.8                     Medical



                                        /2014                   Group



                                                                



                                                                



                                                                



                                                                

 

        Chemistry CHOLESTEROL 179     120 - 200                     Medic

                   Group



                                                                



                                                                



                                                                



                                                                

 

        Chemistry HDL     41      26 - 62                     Medical



                                        /2014                   Group



                                                                



                                                                



                                                                



                                                                

 

        Chemistry LDL     80      0 - 130                     Medical



                                        /2014                   Group



                                                                



                                                                



                                                                



                                                                

 

        Chemistry SGPT (ALT) 42       -                     Medical



                                        /2014                   Group



                                                                



                                                                



                                                                



                                                                

 

        Chemistry SGOT (AST) 28      10 -                     Medical



                                        /2014                   Group



                                                                



                                                                



                                                                



                                                                

 

        Chemistry SODIUM  140     135 - 143                     Medical



                                        /2014                   Group



                                                                



                                                                



                                                                



                                                                

 

        Chemistry POTASSIUM 4.8     3.3 - 5.0                     Medical



                                        /2014                   Group



                                                                



                                                                



                                                                



                                                                

 

        Chemistry BUN     14      10 -                     Medical



                                        /2014                   Group



                                                                



                                                                



                                                                



                                                                

 

        Chemistry CREATININE 1.01    0.46 -                      Medical



                                                   Group



                                                                



                                                                



                                                                



                                                                

 

        Chemistry CALCIUM 9.7     8.6 - 9.8                     Medical



                                        /2014                   Group



                                                                



                                                                



                                                                



                                                                

 

        Chemistry CHOLESTEROL 179     120 - 200                     Medic

al



                                                           Group



                                                                



                                                                



                                                                



                                                                

 

        Chemistry HDL     41      26 - 62                     Medical



                                        /2014                   Group



                                                                



                                                                



                                                                



                                                                

 

        Chemistry LDL     80      0 - 130 /                    Medical



                                        /2014                   Group



                                                                



                                                                



                                                                



                                                                

 

        Chemistry SGPT (ALT) 42      11 -                     Medical



                                        /2014                   Group



                                                                



                                                                



                                                                



                                                                

 

        Chemistry SGOT (AST) 28      10 -                     Medical



                                        /2014                   Group



                                                                



                                                                



                                                                



                                                                

 

        Chemistry URIC ACID 5.2     4.0 - 8.4                     Medical



                                                           Group



                                                                



                                                                



                                                                



                                                                

 

        Chemistry TSH     1.67    0.34 -  08                    Medical



                                                   Group



                                                                



                                                                



                                                                



                                                                

 

        Chemistry SGOT (AST) 28      10 - 42                     Medical



                                                           Group



                                                                



                                                                



                                                                



                                                                

 

        Chemistry URIC ACID 5.2     4.0 - 8.4                     Medical



                                                           Group



                                                                



                                                                



                                                                



                                                                

 

        Chemistry TSH     1.67    0.34 -                      Medical



                                                   Group



                                                                



                                                                



                                                                



                                                                

 

        Chemistry URIC ACID 5.2     4.0 - 8.4                     [facility]



                                                           Group



                                                                



                                                                



                                                                



                                                                

 

        Chemistry HGBA1C  7.0     3.0 - 6.0                     [facility]



                                                           Group



                                                                



                                                                



                                                                



                                                                

 

        Chemistry TSH     1.67    0.34 -                      [facility]



                                                   Group



                                                                



                                                                



                                                                



                                                                

 

        Hematology HGB     13.7    12.3 -                      Medical



                                .3    /2014                   Group



                                                                



                                                                



                                                                



                                                                

 

        Hematology HCT     41.2    36.7 -                      Medical



                                .                   Group



                                                                



                                                                



                                                                



                                                                

 

        Hematology HGB     13.7    12.3 -                      Medical



                                .3    /2014                   Group



                                                                



                                                                



                                                                



                                                                

 

        Hematology HCT     41.2    36.7 -  08                    [facility]



                                .                   Group



                                                                



                                                                



                                                                



                                                                

 

        Chemistry CHOLESTEROL 179                                 [facility]



                                                           Group



                                                                



                                                                



                                                                



                                                                

 

        Chemistry TRIGLYCERIDE 336                                 [facility]

l



                                        /2014                   Group



                                                                



                                                                



                                                                



                                                                

 

        Chemistry CHOLESTEROL 179                                 [facility]



                                                           Group



                                                                



                                                                



                                                                



                                                                

 

        Chemistry TRIGLYCERIDE 336                                 [facility]

l



                                                           Group



                                                                



                                                                



                                                                



                                                                

 

        Chemistry HDL     40                                  [facility]



                                                           Group



                                                                



                                                                



                                                                



                                                                

 

        Chemistry LDL     72                                  [facility]



                                                           Group



                                                                



                                                                



                                                                



                                                                

 

        Chemistry CHOLESTEROL 179                                 [facility]



                                                           Group



                                                                



                                                                



                                                                



                                                                

 

        Chemistry TRIGLYCERIDE 336                                 [facility]

l



                                                           Group



                                                                



                                                                



                                                                



                                                                

 

        Chemistry HDL     40                                  [facility]



                                                           Group



                                                                



                                                                



                                                                



                                                                

 

        Chemistry CHOLESTEROL 179                                 [facility]



                                                           Group



                                                                



                                                                



                                                                



                                                                

 

        Chemistry TRIGLYCERIDE 336                                 [facility]

l



                                                           Group



                                                                



                                                                



                                                                



                                                                

 

        Chemistry HDL     40                                  [facility]



                                                           Group



                                                                



                                                                



                                                                



                                                                

 

        Chemistry PSA     0.38                                [facility]



                                                           Group



                                                                



                                                                



                                                                



                                                                

 

        Chemistry HGBA1C  7.4                                 [facility]



                                                           Group



                                                                



                                                                



                                                                



                                                                

 

        Chemistry LDL     72                                  [facility]



                                                           Group



                                                                



                                                                



                                                                



                                                                

 

        Chemistry PSA     0.38                                [facility]



                                                           Group



                                                                



                                                                



                                                                



                                                                

 

        Chemistry HGBA1C  7.4                                 [facility]



                                                           Group



                                                                



                                                                



                                                                



                                                                

 

        Chemistry CHOLESTEROL 181                                 [facility]



                                                           Group



                                                                



                                                                



                                                                



                                                                

 

        Chemistry TRIGLYCERIDE 277                                 [facility]

l



                                                           Group



                                                                



                                                                



                                                                



                                                                

 

        Chemistry CHOLESTEROL 181                                 [facility]



                                                           Group



                                                                



                                                                



                                                                



                                                                

 

        Chemistry TRIGLYCERIDE 277                                 [facility]

l



                                                           Group



                                                                



                                                                



                                                                



                                                                

 

        Chemistry HDL     38                                  [facility]



                                        /                   Group



                                                                



                                                                



                                                                



                                                                

 

        Chemistry LDL     88                                  [facility]



                                        /                   Group



                                                                



                                                                



                                                                



                                                                

 

        Chemistry CHOLESTEROL 181                                 [facility]



                                        /                   Group



                                                                



                                                                



                                                                



                                                                

 

        Chemistry TRIGLYCERIDE 277                                 [facility]

l



                                                           Group



                                                                



                                                                



                                                                



                                                                

 

        Chemistry HDL     38                                  [facility]



                                        /                   Group



                                                                



                                                                



                                                                



                                                                

 

        Chemistry CHOLESTEROL 181                                 [facility]



                                        /                   Group



                                                                



                                                                



                                                                



                                                                

 

        Chemistry TRIGLYCERIDE 277                                 [facility]

l



                                                           Group



                                                                



                                                                



                                                                



                                                                

 

        Chemistry HDL     38                                  [facility]



                                        /                   Group



                                                                



                                                                



                                                                



                                                                

 

        Chemistry HGBA1C  6.7                                 [facility]



                                        /                   Group



                                                                



                                                                



                                                                



                                                                

 

        Chemistry TSH     1.22                                [facility]



                                        /                   Group



                                                                



                                                                



                                                                



                                                                

 

        Chemistry LDL     88                                  [facility]



                                        /                   Group



                                                                



                                                                



                                                                



                                                                

 

        Chemistry HGBA1C  6.7                                 [facility]



                                        /                   Group



                                                                



                                                                



                                                                



                                                                

 

        Chemistry TSH     1.22                                [facility]



                                        /                   Group



                                                                



                                                                



                                                                



                                                                

 

        Chemistry CHOLESTEROL 188                                 [facility]



                                        /                   Group



                                                                



                                                                



                                                                



                                                                

 

        Chemistry TRIGLYCERIDE 384                                 [facility]

l



                                                           Group



                                                                



                                                                



                                                                



                                                                

 

        Chemistry CHOLESTEROL 188                                 [facility]



                                        /                   Group



                                                                



                                                                



                                                                



                                                                

 

        Chemistry TRIGLYCERIDE 384                                 [facility]

l



                                                           Group



                                                                



                                                                



                                                                



                                                                

 

        Chemistry HDL     38                                  [facility]



                                        /                   Group



                                                                



                                                                



                                                                



                                                                

 

        Chemistry LDL     73                                  [facility]



                                        /                   Group



                                                                



                                                                



                                                                



                                                                

 

        Chemistry CHOLESTEROL 188                                 [facility]



                                        /                   Group



                                                                



                                                                



                                                                



                                                                

 

        Chemistry TRIGLYCERIDE 384                                 [facility]

l



                                                           Group



                                                                



                                                                



                                                                



                                                                

 

        Chemistry HDL     38                                  [facility]



                                        /                   Group



                                                                



                                                                



                                                                



                                                                

 

        Chemistry CHOLESTEROL 188                                 [facility]



                                        /                   Group



                                                                



                                                                



                                                                



                                                                

 

        Chemistry TRIGLYCERIDE 384                                 [facility]

l



                                                           Group



                                                                



                                                                



                                                                



                                                                

 

        Chemistry HDL     38                                  [facility]



                                        /                   Group



                                                                



                                                                



                                                                



                                                                

 

        Chemistry PSA     0.45                                [facility]



                                        /                   Group



                                                                



                                                                



                                                                



                                                                

 

        Chemistry HGBA1C  6.3                                 [facility]



                                        /                   Group



                                                                



                                                                



                                                                



                                                                

 

        Chemistry LDL     73                                  [facility]



                                        /                   Group



                                                                



                                                                



                                                                



                                                                

 

        Chemistry PSA     0.45                                [facility]



                                        /                   Group



                                                                



                                                                



                                                                



                                                                

 

        Chemistry HGBA1C  6.3                                 [facility]



                                        /                   Group



                                                                



                                                                



                                                                



                                                                

 

        Chemistry CHOLESTEROL 216                                 [facility]



                                        /                   Group



                                                                



                                                                



                                                                



                                                                

 

        Chemistry TRIGLYCERIDE 488                                 [facility]

[facility]



                                        /                   Group



                                                                



                                                                



                                                                



                                                                

 

        Chemistry CHOLESTEROL 216                                 [facility]



                                        /                   Group



                                                                



                                                                



                                                                



                                                                

 

        Chemistry TRIGLYCERIDE 488                                 [facility]

[facility]



                                        /                   Group



                                                                



                                                                



                                                                



                                                                

 

        Chemistry HDL     38                                  [facility]



                                        /                   Group



                                                                



                                                                



                                                                



                                                                

 

        Chemistry CHOLESTEROL 216                                 [facility]



                                        /                   Group



                                                                



                                                                



                                                                



                                                                

 

        Chemistry TRIGLYCERIDE 488                                 [facility]

l



                                        /                   Group



                                                                



                                                                



                                                                



                                                                

 

        Chemistry HDL     38                                  [facility]



                                        /                   Group



                                                                



                                                                



                                                                



                                                                

 

        Chemistry CHOLESTEROL 216                                 [facility]



                                        /                   Group



                                                                



                                                                



                                                                



                                                                

 

        Chemistry TRIGLYCERIDE 488                                 [facility]

l



                                        /                   Group



                                                                



                                                                



                                                                



                                                                

 

        Chemistry LDL     110                                 [facility]



                                        /                   Group



                                                                



                                                                



                                                                



                                                                

 

        Chemistry HGBA1C  6.6                                 [facility]



                                        /2012                   Group



                                                                



                                                                



                                                                



                                                                

 

        Chemistry HDL     38                                  [facility]



                                        /2012                   Group



                                                                



                                                                



                                                                



                                                                

 

        Chemistry LDL     110                                 [facility]



                                        /2012                   Group



                                                                



                                                                



                                                                



                                                                

 

        Chemistry HGBA1C  6.6                                 [facility]



                                        /2012                   Group



                                                                



                                                                



                                                                



                                                                

 

        Chemistry CHOLESTEROL 244                                 [facility]



                                        /2012                   Group



                                                                



                                                                



                                                                



                                                                

 

        Chemistry TRIGLYCERIDE 615                                 [facility]

l



                                        /2012                   Group



                                                                



                                                                



                                                                



                                                                

 

        Chemistry CHOLESTEROL 244                                 [facility]



                                        /2012                   Group



                                                                



                                                                



                                                                



                                                                

 

        Chemistry TRIGLYCERIDE 615                                 [facility]

l



                                                           Group



                                                                



                                                                



                                                                



                                                                

 

        Chemistry HDL     40                                  [facility]



                                        /2012                   Group



                                                                



                                                                



                                                                



                                                                

 

        Chemistry LDL     106                                 [facility]



                                        /2012                   Group



                                                                



                                                                



                                                                



                                                                

 

        Chemistry PSA     1.53                                [facility]



                                        /2012                   Group



                                                                



                                                                



                                                                



                                                                

 

        Chemistry CHOLESTEROL 244                                 [facility]



                                        /2012                   Group



                                                                



                                                                



                                                                



                                                                

 

        Chemistry TRIGLYCERIDE 615                                 [facility]

l



                                        /2012                   Group



                                                                



                                                                



                                                                



                                                                

 

        Chemistry HDL     40                                  [facility]



                                        /2012                   Group



                                                                



                                                                



                                                                



                                                                

 

        Chemistry CHOLESTEROL 244                                 [facility]



                                        /2012                   Group



                                                                



                                                                



                                                                



                                                                

 

        Chemistry TRIGLYCERIDE 615                                 [facility]

l



                                        /2012                   Group



                                                                



                                                                



                                                                



                                                                

 

        Chemistry HDL     40                                  [facility]



                                        /2012                   Group



                                                                



                                                                



                                                                



                                                                

 

        Chemistry LDL     106                                 [facility]



                                        /2012                   Group



                                                                



                                                                



                                                                



                                                                

 

        Chemistry HGBA1C  7.0                                 [facility]



                                        /2012                   Group



                                                                



                                                                



                                                                



                                                                

 

        Chemistry TSH     2.08                                [facility]



                                        /2012                   Group



                                                                



                                                                



                                                                



                                                                

 

        Chemistry PSA     1.53                                [facility]



                                        /2012                   Group



                                                                



                                                                



                                                                



                                                                

 

        Chemistry HGBA1C  7.0                                 [facility]



                                        /2012                   Group



                                                                



                                                                



                                                                



                                                                

 

        Chemistry TSH     2.08                                [facility]



                                        /2012                   Group



                                                                



                                                                



                                                                



                                                                

 

        Chemistry PSA     1.53                                [facility]



                                        /2012                   Group



                                                                



                                                                



                                                                



                                                                

 

        Chemistry PSA     1.53                                [facility]



                                        /2012                   Group



                                                                



                                                                



                                                                



                                                                

 

        Chemistry PSA     1.53                                [facility]



                                        /2012                   Group



                                                                



                                                                



                                                                



                                                                

 

        Chemistry PSA     1.53                                [facility]



                                        /2012                   Group



                                                                



                                                                



                                                                



                                                                







Pathology Reports







                                        No Data Provided for This Section







Diagnostic Reports







                Report          Value           Date            Source



                                                                

 

                Chest 2 views DX PROCEDURE INFORMATION: 2020      [facility]



                                Exam: XR Chest, 2 Views                 



                                Exam date and time: 2020 7:09 AM            

     



                                Age: 64 years old                 



                                Clinical indication: Chronic obstructive pulmona

ry disease, unspecified;                 



                                Additional info: /right sided lung pain         

        



                                TECHNIQUE:                      



                                Imaging protocol: XR of the chest               

  



                                Views: 2 views.                 



                                COMPARISON:                     



                                CR CHEST 2 VIEWS DX 2019 11:39 AM           

      



                                FINDINGS:                       



                                Lungs: The lungs are clear.                 



                                Pleural space: No pleural abnormality.          

       



                                Heart/Mediastinum: The heart and vascular markin

gs are stable.                 



                                Bones/joints: No significant skeletal abnormalit

y.                 



                                IMPRESSION:                     



                                No evidence of an active cardiopulmonary process

.                 



                                Kel Corey MD On 2020  09:34:18; [facility]

[id number]                 



                                                                

 

                Chest 2 views DX EXAM: Chest 2 views DX 2019      [facility]



                                DATE: 2019 11:37 CDT                 



                                INDICATION: Dyspnea.                 



                                COMPARISON: None.                 



                                                    FINDINGS: Grossly normal car

diac silhouette and mediastinum. No focal 

consolidation, significant pleural effusion or pneumothorax.                    

       



                                IMPRESSION: No acute cardiopulmonary process det

ected.                 



                                                                



                                                                



                                SL: [id number]                     



                                                                







Consultation Notes







                                        No Data Provided for This Section







Discharge Summaries







                                        No Data Provided for This Section







History and Physicals







                                        No Data Provided for This Section







Vital Signs







             Vital Sign   Value        Date         Comments     Source



                                                                 

 

             Systolic (mm Hg) 150          2020                 [facility] 

[facility]



                                                                 



                                                                 

 

             Diastolic (mm Hg) 90           2020                 [facility]

 Group



                                                                 



                                                                 

 

             Heart Rate   78           2020                 [facility]

p



                                                                 



                                                                 

 

             Temperature Oral (F) 98.7 F       2020                 [facility]

[facility]



                                                                 



                                                                 

 

             Height       180.34 cm    2020                 [facility]

p



                                                                 



                                                                 

 

             Weight       90.114       2020                 [facility]

p



                                                                 



                                                                 

 

             BMI Calculated 27.71        2020                 [facility]

oup



                                                                 



                                                                 

 

             Systolic (mm Hg) 136          2020                 [facility] 

Group



                                                                 



                                                                 

 

             Diastolic (mm Hg) 78           2020                 [facility]

 Group



                                                                 



                                                                 

 

             Heart Rate   80           2020                 [facility]

p



                                                                 



                                                                 

 

             Temperature Oral (F) 97.8 F       2020                 [facility]

[facility]



                                                                 



                                                                 

 

             Height       177.8 cm     2020                 [facility]

p



                                                                 



                                                                 

 

             Weight       89.545       2020                 [facility]

p



                                                                 



                                                                 

 

             BMI Calculated 28.33        2020                 [facility]

oup



                                                                 



                                                                 

 

             Height       180.34 cm    2020                 [facility]

p



                                                                 



                                                                 

 

             Weight       89.545       2020                 [facility]

p



                                                                 



                                                                 

 

             BMI Calculated 27.53        2020                 [facility]

oup



                                                                 



                                                                 

 

             Systolic (mm Hg) 135          2020                 [facility] 

Group



                                                                 



                                                                 

 

             Diastolic (mm Hg) 78           2020                 [facility]

 Group



                                                                 



                                                                 

 

             Heart Rate   80           2020                 [facility]

p



                                                                 



                                                                 

 

             Temperature Oral (F) 97.8 F       2020                 [facility]

[facility]



                                                                 



                                                                 

 

             Systolic (mm Hg) 144          2020                 [facility] 

Group



                                                                 



                                                                 

 

             Diastolic (mm Hg) 83           2020                 [facility]

 Group



                                                                 



                                                                 

 

             Heart Rate   85           2020                 [facility]

p



                                                                 



                                                                 

 

             Temperature Oral (F) 98.3 F       2020                 [facility]

[facility]



                                                                 



                                                                 

 

             Height       180.34 cm    2020                 [facility]

[facility]



                                                                 



                                                                 

 

             Weight       90.114       2020                 [facility]

p



                                                                 



                                                                 

 

             BMI Calculated 27.71        2020                [facility]

oup



                                                                 



                                                                 

 

             Systolic (mm Hg) 128          2019                 [facility]



                                                                 



                                                                 

 

             Diastolic (mm Hg) 82           2019                 [facility]



                                                                 



                                                                 

 

             Heart Rate   93           2019                 [facility]

p



                                                                 



                                                                 

 

             Height       180.34 cm    2019                 [facility]

p



                                                                 



                                                                 

 

             Weight       93.693       2019                 [facility]

p



                                                                 



                                                                 

 

             BMI Calculated 28.81        2019                 [facility]

[facility]



                                                                 



                                                                 

 

             Systolic (mm Hg) 116          2019                 [facility]



                                                                 



                                                                 

 

             Diastolic (mm Hg) 64           2019                 [facility]



                                                                 



                                                                 

 

             Heart Rate   99           2019                 [facility]

p



                                                                 



                                                                 

 

             Temperature Oral (F) 98.2 F       2019                 [facility]



                                                                 



                                                                 

 

             Systolic (mm Hg) 142          2019                 [facility]



                                                                 



                                                                 

 

             Diastolic (mm Hg) 89           2019                 [facility]



                                                                 



                                                                 

 

             Heart Rate   85           2019                 [facility]

p



                                                                 



                                                                 

 

             Temperature Oral (F) 98.0 F       2019                 [facility]



                                                                 



                                                                 

 

             Height       180.34 cm    2019                 [facility]

p



                                                                 



                                                                 

 

             Weight       97.045       2019                 [facility]

p



                                                                 



                                                                 

 

             BMI Calculated 29.84        2019                 [facility]

[facility]



                                                                 



                                                                 

 

             BMI Calculated 29.8         2019                 [facility]

[facility]



                                                                 



                                                                 

 

             Heart Rate   80           2019                 [facility]

p



                                                                 



                                                                 

 

             Systolic (mm Hg) 154          2019                 [facility]



                                                                 



                                                                 

 

             Diastolic (mm Hg) 89           2019                 [facility]



                                                                 



                                                                 

 

             Weight       96.932       2019                 [facility]

p



                                                                 



                                                                 

 

             Height       180.34 cm    2019                 [facility]

p



                                                                 



                                                                 

 

             Temperature Oral (F) 98.1 F       2019                 [facility]



                                                                 



                                                                 

 

             Weight       97.727       2019                 [facility]

p



                                                                 



                                                                 

 

             Heart Rate   82           2019                 [facility]

p



                                                                 



                                                                 

 

             Respitory Rate 15           2019                 [facility]

[facility]



                                                                 



                                                                 

 

             Temperature Oral (F) 97.7 F       2019                 [facility]



                                                                 



                                                                 

 

             Systolic (mm Hg) 133          2019                 [facility]



                                                                 



                                                                 

 

             Diastolic (mm Hg) 82           2019                 [facility]



                                                                 



                                                                 

 

             BMI Calculated 30.05        2019                 [facility]

[facility]



                                                                 



                                                                 

 

             Weight       97.727       2019                 [facility]

p



                                                                 



                                                                 

 

             Height       180.34 cm    2019                 [facility]

[facility]



                                                                 



                                                                 

 

             Heart Rate   82           2019                 [facility]

p



                                                                 



                                                                 

 

             Temperature Oral (F) 97.7 F       2019                 [facility]



                                                                 



                                                                 

 

             Systolic (mm Hg) 133          2019                 [facility]



                                                                 



                                                                 

 

             Diastolic (mm Hg) 82           2019                 [facility]



                                                                 



                                                                 

 

             Weight       97.33        01/15/2019                 [facility]

p



                                                                 



                                                                 

 

             BMI Calculated 29.93        01/15/2019                 [facility]

[facility]



                                                                 



                                                                 

 

             Height       180.34 cm    01/15/2019                 [facility]

p



                                                                 



                                                                 

 

             Systolic (mm Hg) 165          01/15/2019                 [facility]



                                                                 



                                                                 

 

             Diastolic (mm Hg) 99           01/15/2019                 [facility]



                                                                 



                                                                 

 

             Heart Rate   85           01/15/2019                 [facility]

p



                                                                 



                                                                 

 

             Temperature Oral (F) 97.9 F       01/15/2019                 [facility]



                                                                 



                                                                 

 

             BMI Calculated 28.86        11/15/2018                 [facility]

[facility]



                                                                 



                                                                 

 

             Weight       93.864       11/15/2018                 [facility]

p



                                                                 



                                                                 

 

             Height       180.34 cm    11/15/2018                 [facility]

p



                                                                 



                                                                 

 

             Temperature Oral (F) 97.8 F       11/15/2018                 [facility]



                                                                 



                                                                 

 

             Heart Rate   85           11/15/2018                 [facility]

p



                                                                 



                                                                 

 

             Systolic (mm Hg) 127          11/15/2018                 [facility]



                                                                 



                                                                 

 

             Diastolic (mm Hg) 79           11/15/2018                 [facility]



                                                                 



                                                                 

 

             Systolic (mm Hg) 131          09/10/2018                 [facility]



                                                                 



                                                                 

 

             Diastolic (mm Hg) 84           09/10/2018                 [facility]



                                                                 



                                                                 

 

             Temperature Oral (F) 98.8 F       09/10/2018                 [facility]



                                                                 



                                                                 

 

             Heart Rate   96           09/10/2018                 [facility]

p



                                                                 



                                                                 

 

             Weight       94.091       09/10/2018                 [facility]

p



                                                                 



                                                                 

 

             Height       180.34 cm    09/10/2018                 [facility]

p



                                                                 



                                                                 

 

             BMI Calculated 28.93        09/10/2018                 [facility]

[facility]



                                                                 



                                                                 

 

             Weight       94.205       2018                 [facility]

p



                                                                 



                                                                 

 

             BMI Calculated 28.97        2018                 [facility]

[facility]



                                                                 



                                                                 

 

             Height       180.34 cm    2018                 [facility]

p



                                                                 



                                                                 

 

             Temperature Oral (F) 98.8 F       2018                 [facility]



                                                                 



                                                                 

 

             Heart Rate   88           2018                 [facility]

p



                                                                 



                                                                 

 

             Systolic (mm Hg) 127          2018                 [facility]



                                                                 



                                                                 

 

             Diastolic (mm Hg) 81           2018                [facility]



                                                                 



                                                                 

 

             Weight       93.807       2018                 [facility]

p



                                                                 



                                                                 

 

             BMI Calculated 28.84        2018                 [facility]

[facility]



                                                                 



                                                                 

 

             Height       180.34 cm    2018                [facility]

p



                                                                 



                                                                 

 

             Systolic (mm Hg) 139          2018                 [facility]



                                                                 



                                                                 

 

             Diastolic (mm Hg) 84           2018                 [facility]



                                                                 



                                                                 

 

             Heart Rate   87           2018                 [facility]

p



                                                                 



                                                                 

 

             Temperature Oral (F) 98.0 F       2018                 [facility]



                                                                 



                                                                 

 

             Weight       92.841       05/15/2018                 [facility]

p



                                                                 



                                                                 

 

             Heart Rate   89           05/15/2018                 [facility]

p



                                                                 



                                                                 

 

             Systolic (mm Hg) 128          05/15/2018                 [facility]



                                                                 



                                                                 

 

             Diastolic (mm Hg) 83           05/15/2018                 [facility]



                                                                 



                                                                 

 

             BMI Calculated 29.69        2018                 [facility]

[facility]



                                                                 



                                                                 

 

             Weight       93.864       2018                 [facility]

p



                                                                 



                                                                 

 

             Height       177.8 cm     2018                 [facility]

p



                                                                 



                                                                 

 

             Temperature Oral (F) 98.1 F       2018                 [facility]



                                                                 



                                                                 

 

             Heart Rate   88           2018                 [facility]

p



                                                                 



                                                                 

 

             Systolic (mm Hg) 140          2018                 [facility]



                                                                 



                                                                 

 

             Diastolic (mm Hg) 70           2018                 [facility]



                                                                 



                                                                 

 

             Weight       93.295       2018                 [facility]

p



                                                                 



                                                                 

 

             Heart Rate   99           2018                 [facility]

p



                                                                 



                                                                 

 

             Temperature Oral (F) 98.0 F       2018                 [facility]



                                                                 



                                                                 

 

             Systolic (mm Hg) 142          2018                 [facility]



                                                                 



                                                                 

 

             Diastolic (mm Hg) 87           2018                 [facility]



                                                                 



                                                                 

 

             BMI Calculated 29.76        2018                 [facility]

[facility]



                                                                 



                                                                 

 

             Weight       94.091       2018                 [facility]

p



                                                                 



                                                                 

 

             Height       177.8 cm     2018                 [facility]

p



                                                                 



                                                                 

 

             Systolic (mm Hg) 119          2018                 [facility]



                                                                 



                                                                 

 

             Diastolic (mm Hg) 74           2018                 [facility]



                                                                 



                                                                 

 

             Temperature Oral (F) 98.5 F       2018                 [facility]



                                                                 



                                                                 

 

             Heart Rate   92           2018                 [facility]

p



                                                                 



                                                                 

 

             Systolic (mm Hg) 114          2018                 [facility]

[facility]



                                                                 



                                                                 

 

             Diastolic (mm Hg) 94           2018                 [facility]

st



                                                                 



                                                                 

 

             Respitory Rate 24           2018                 [facility]



                                                                 



                                                                 

 

             Systolic (mm Hg) 114          2018                [facility]

t



                                                                 



                                                                 

 

             Diastolic (mm Hg) 94           2018                [facility]

st



                                                                 



                                                                 

 

             Respitory Rate 21           2018                [facility]



                                                                 



                                                                 

 

             Systolic (mm Hg) 113          2018                [facility]

t



                                                                 



                                                                 

 

             Diastolic (mm Hg) 69           2018                [facility]

st



                                                                 



                                                                 

 

             Respitory Rate 17           2018                [facility]



                                                                 



                                                                 

 

             Weight       95.6         2018                [facility]



                                                                 



                                                                 

 

             BMI Calculated 30.2         2018                [facility]



                                                                 



                                                                 

 

             Weight       95.455       2018                [facility]



                                                                 



                                                                 

 

             Height       177.8 cm     2018                [facility]



                                                                 



                                                                 

 

             Weight       93.75        2018                 [facility]

[facility]



                                                                 



                                                                 

 

             BMI Calculated 29.66        2018                 [facility]

[facility]



                                                                 



                                                                 

 

             Height       177.8 cm     2018                 [facility]

p



                                                                 



                                                                 

 

             Heart Rate   84           2018                 [facility]

p



                                                                 



                                                                 

 

             Temperature Oral (F) 97.8 F       2018                 [facility]



                                                                 



                                                                 

 

             Systolic (mm Hg) 121          2018                 [facility]



                                                                 



                                                                 

 

             Diastolic (mm Hg) 78           2018                 [facility]



                                                                 



                                                                 

 

             Weight       94.716       2018                 [facility]

p



                                                                 



                                                                 

 

             Heart Rate   96           2018                 [facility]

p



                                                                 



                                                                 

 

             Systolic (mm Hg) 136          2018                 [facility]



                                                                 



                                                                 

 

             Diastolic (mm Hg) 86           2018                 [facility]



                                                                 



                                                                 

 

             BMI Calculated 30.27        2017                 [facility]

[facility]



                                                                 



                                                                 

 

             Height       177.8 cm     2017                 [facility]

p



                                                                 



                                                                 

 

             Weight       95.682       2017                 [facility]

p



                                                                 



                                                                 

 

             Temperature Oral (F) 97.9 F       2017                 [facility]



                                                                 



                                                                 

 

             Systolic (mm Hg) 137          2017                 [facility]



                                                                 



                                                                 

 

             Diastolic (mm Hg) 86           2017                 [facility]



                                                                 



                                                                 

 

             Respitory Rate 19           2017                 [facility]

[facility]



                                                                 



                                                                 

 

             Heart Rate   83           2017                 [facility]

p



                                                                 



                                                                 

 

             Heart Rate   88           2017                 [facility]

p



                                                                 



                                                                 

 

             Systolic (mm Hg) 131          2017                 [facility]



                                                                 



                                                                 

 

             Diastolic (mm Hg) 81           2017                 [facility]



                                                                 



                                                                 

 

             Weight       94.602       2017                 [facility]

p



                                                                 



                                                                 

 

             BMI Calculated 30.2         2015                 [facility]



                                                                 



                                                                 

 

             Weight       95.455       2015                 [facility]



                                                                 



                                                                 

 

             Height       177.8 cm     2015                 [facility]



                                                                 



                                                                 

 

             Weight       208.03       2015                 [facility]

[facility]



                                                                 



                                                                 

 

             Temperature Oral (F) 97.7 F       2015                 [facility]

[facility]



                                                                 



                                                                 

 

             Heart Rate   82           2015                [facility]

p



                                                                 



                                                                 

 

             Systolic (mm Hg) 149          2015                [facility]



                                                                 



                                                                 

 

             Diastolic (mm Hg) 92           2015                 [facility]



                                                                 



                                                                 

 

             Weight       206.6        2015                 [facility]

p



                                                                 



                                                                 

 

             Temperature Oral (F) 97.8 F       2015                 [facility]



                                                                 



                                                                 

 

             Respitory Rate 16           2015                 [facility]

[facility]



                                                                 



                                                                 

 

             Heart Rate   70           2015                 [facility]

p



                                                                 



                                                                 

 

             Systolic (mm Hg) 152          2015                 [facility]



                                                                 



                                                                 

 

             Diastolic (mm Hg) 87           2015                 [facility]



                                                                 



                                                                 

 

             Weight       212          2015                 [facility]

p



                                                                 



                                                                 

 

             Temperature Oral (F) 97.9 F       2015                 [facility]



                                                                 



                                                                 

 

             Systolic (mm Hg) 140          2015                 [facility]



                                                                 



                                                                 

 

             Diastolic (mm Hg) 80           2015                 [facility]



                                                                 



                                                                 

 

             Heart Rate   80           2015                 [facility]

p



                                                                 



                                                                 

 

             Weight       210.2        2015                 [facility]

p



                                                                 



                                                                 

 

             Temperature Oral (F) 97.8 F       2015                 [facility]



                                                                 



                                                                 

 

             Respitory Rate 16           2015                 [facility]

[facility]



                                                                 



                                                                 

 

             Heart Rate   60           2015                 [facility]

p



                                                                 



                                                                 

 

             Systolic (mm Hg) 138          2015                 [facility]



                                                                 



                                                                 

 

             Diastolic (mm Hg) 86           2015                 [facility]



                                                                 



                                                                 

 

             Weight       211          10/13/2014                 [facility]

p



                                                                 



                                                                 

 

             Systolic (mm Hg) 140          10/13/2014                 [facility]



                                                                 



                                                                 

 

             Diastolic (mm Hg) 80           10/13/2014                 [facility]



                                                                 



                                                                 

 

             Heart Rate   80           10/13/2014                 [facility]

p



                                                                 



                                                                 

 

             Temperature Oral (F) 98.8 F       10/13/2014                 [facility]



                                                                 



                                                                 

 

             Weight       206.2        2014                 [facility]

p



                                                                 



                                                                 

 

             Temperature Oral (F) 98.6 F       2014                 [facility]

[facility]



                                                                 



                                                                 

 

             Respitory Rate 16           2014                 [facility]

[facility]



                                                                 



                                                                 

 

             Heart Rate   72           2014                 [facility]

p



                                                                 



                                                                 

 

             Systolic (mm Hg) 128          2014                 [facility]



                                                                 



                                                                 

 

             Diastolic (mm Hg) 82           2014                 [facility]



                                                                 



                                                                 

 

             Weight       213.8        2014                 [facility]

p



                                                                 



                                                                 

 

             Systolic (mm Hg) 120          2014                 [facility]



                                                                 



                                                                 

 

             Diastolic (mm Hg) 60           2014                 [facility]



                                                                 



                                                                 

 

             Heart Rate   64           2014                 [facility]

p



                                                                 



                                                                 

 

             Diastolic (mm Hg) 79           2014                [facility]



                                                                 



                                                                 

 

             Respitory Rate 20           2014                [facility]



                                                                 



                                                                 

 

             Heart Rate   75           2014                [facility]



                                                                 



                                                                 

 

             Systolic (mm Hg) 127          2014                [facility]

t



                                                                 



                                                                 

 

             Weight       92.273       2014                [facility]



                                                                 



                                                                 

 

             BMI Calculated 28.37        2014                [facility]



                                                                 



                                                                 

 

             Height       180.34 cm    2014                [facility]



                                                                 



                                                                 

 

             Weight       212          2014                 [facility]

p



                                                                 



                                                                 

 

             Systolic (mm Hg) 118          2014                 [facility]



                                                                 



                                                                 

 

             Diastolic (mm Hg) 78           2014                 [facility]



                                                                 



                                                                 

 

             Heart Rate   76           2014                 [facility]

p



                                                                 



                                                                 

 

             Weight       213.8        2014                 [facility]

p



                                                                 



                                                                 

 

             Temperature Oral (F) 97.1 F       2014                 [facility]



                                                                 



                                                                 

 

             Heart Rate   72           2014                 [facility]

p



                                                                 



                                                                 

 

             Systolic (mm Hg) 112          2014                 [facility]



                                                                 



                                                                 

 

             Diastolic (mm Hg) 80           2014                 [facility]



                                                                 



                                                                 

 

             Respitory Rate 16           2014                 [facility]

[facility]



                                                                 



                                                                 

 

             Weight       213.4        2014                 [facility]

p



                                                                 



                                                                 

 

             Systolic (mm Hg) 144          2014                 [facility]



                                                                 



                                                                 

 

             Diastolic (mm Hg) 88           2014                 [facility]



                                                                 



                                                                 

 

             Heart Rate   60           2014                 [facility]

p



                                                                 



                                                                 

 

             Weight       213.6        2014                 [facility]

p



                                                                 



                                                                 

 

             Respitory Rate 18           2014                 [facility]

[facility]



                                                                 



                                                                 

 

             Systolic (mm Hg) 138          2014                 [facility]



                                                                 



                                                                 

 

             Diastolic (mm Hg) 88           2014                 [facility]



                                                                 



                                                                 

 

             Heart Rate   78           2014                 [facility]

p



                                                                 



                                                                 

 

             Weight       222.4        2013                 [facility]

p



                                                                 



                                                                 

 

             Temperature Oral (F) 97.5 F       2013                 [facility]

[facility]



                                                                 



                                                                 

 

             Heart Rate   72           2013                 [facility]

p



                                                                 



                                                                 

 

             Systolic (mm Hg) 134          2013                 [facility]



                                                                 



                                                                 

 

             Diastolic (mm Hg) 88           2013                 [facility]



                                                                 



                                                                 

 

             Respitory Rate 20           2013                 [facility]

[facility]



                                                                 



                                                                 

 

             Weight       214          2013                 [facility]

p



                                                                 



                                                                 

 

             Systolic (mm Hg) 130          2013                 [facility]



                                                                 



                                                                 

 

             Diastolic (mm Hg) 80           2013                 [facility]



                                                                 



                                                                 

 

             Heart Rate   80           2013                 [facility]

p



                                                                 



                                                                 

 

             Weight       211.4        2013                 [facility]

p



                                                                 



                                                                 

 

             Temperature Oral (F) 97.6 F       2013                 [facility]

[facility]



                                                                 



                                                                 

 

             Heart Rate   76           2013                 [facility]

p



                                                                 



                                                                 

 

             Systolic (mm Hg) 128          2013                 [facility]



                                                                 



                                                                 

 

             Diastolic (mm Hg) 86           2013                 [facility]



                                                                 



                                                                 

 

             Respitory Rate 16           2013                 [facility]

[facility]



                                                                 



                                                                 

 

             Weight       211.4        2013                 [facility]

p



                                                                 



                                                                 

 

             Systolic (mm Hg) 150          2013                 [facility]



                                                                 



                                                                 

 

             Diastolic (mm Hg) 90           2013                 [facility]



                                                                 



                                                                 

 

             Heart Rate   72           2013                 [facility]

p



                                                                 



                                                                 

 

             Weight       220.2        2013                 [facility]

p



                                                                 



                                                                 

 

             Temperature Oral (F) 97.4 F       2013                 [facility]

[facility]



                                                                 



                                                                 

 

             Heart Rate   72           2013                 [facility]

p



                                                                 



                                                                 

 

             Systolic (mm Hg) 118          2013                 [facility] 

Group



                                                                 



                                                                 

 

             Diastolic (mm Hg) 82           2013                 Medical

 Group



                                                                 



                                                                 

 

             Weight       218          2012                 [facility]

p



                                                                 



                                                                 

 

             Height       71.25        2012                 [facility]

p



                                                                 



                                                                 

 

             Systolic (mm Hg) 156          2012                 [facility] 

Group



                                                                 



                                                                 

 

             Diastolic (mm Hg) 86           2012                 Medical

 Group



                                                                 



                                                                 

 

             Heart Rate   80           2012                 [facility]

p



                                                                 



                                                                 

 

             Temperature Oral (F) 98.1 F       2012                 [facility]

[facility]



                                                                 



                                                                 







Encounters







       Location Location Encounter Encounter Reason Attending ADM    [facility]     [facility]

us Source



              Details Type   Number For    Provider Date   Date          



                                   Visit                              



                                                                      



                                                                      



                                                                      

 

       [facility]        Office [id number]        Art             





       [facility]        Visit  7020          [place], /2014         Emma Valle MD                          Group



       Family                                                         



       Practice                                                         



                                                                      



                                                                      

 

       [facility]        Office [phone]        Art             





       [place] [facility]        Visit  7000          [place], /2014         Emma Singh MD                          Group



       Cardiology                                                         



                                                                      



                                                                      

 

       [facility]        Bedded 61956919 _MAPID: Dylon          



       [facility]        Outpatient [id number] AYDEE Andrez /2014     

    [facility]                      [id number]                             [address]        Lab Report [id number]        Art           

  



       [facility]               6980          [place], /2014         Medic

al



       Medical                             MD                          Group



       Group -                                                         



       [facility]                                                         



                                                                      



                                                                      

 

       [facility]        Office [id number]        Art             





       [place] [facility]        Visit  7020          [facility]         Me

gavin Gilbert MD                          Group



       Cardiology                                                         



                                                                      



                                                                      

 

       [facility]        Lab Report [id number]        Art         

    [facility]



       [place] [facility]               [address]          [facility]          [facility]

gavin Valle MD                          Group



       Family                                                         



       Practice                                                         



                                                                      



                                                                      

 

       [facility]        Office [phone]        Art             





       [facility]        Visit  1040          [place], /2014         Me

gavin Valle MD                          Group



       Family                                                         



       Practice                                                         



                                                                      



                                                                      

 

       [facility]        Lab Report [id number]        Art         

    [facility]



       [place] [facility]               1990          [facility]         Me

gavin Valle MD                          Group



       Family                                                         



       Practice                                                         



                                                                      



                                                                      

 

       [facility]        Lab Report [id number]        Art    08/15  08/15     

    [facility]



       [place] [facility]               93[address]          [facility]          Me

gavin Valle MD                          Group



       Family                                                         



       Practice                                                         



                                                                      



                                                                      

 

       [facility]        Office [id number]        Art    10/13  10/13         





       [facility]        Visit  8850          [facility]          [facility]

gavin Gilbert MD                          Group



       Cardiology                                                         



                                                                      



                                                                      

 

       [facility]        Lab Report [id number]        Art         

    



       [place] [facility]               9900          [facility]         Me

gavin Valle MD                          Group



       Family                                                         



       Practice                                                         



                                                                      



                                                                      

 

       [facility]        Lab Report [id number]        Art         

    



       [facility]               6270          [facility]         Me

gavin Valle MD                          Group



       Family                                                         



       Practice                                                         



                                                                      



                                                                      

 

       [facility]        Office [phone]        Dylon          





       [facility]        Visit  8690          [place], /2015         Vinay Gilbert MD                          Group



       Cardiology                                                         



                                                                      



                                                                      

 

       [facility]        Lab Report [id number]        Jorge L       

    



       [place] [facility]               9530          [place], /2015         Med

ical



       [facility]                             MD                          Group



       Family                                                         



       Practice                                                         



                                                                      



                                                                      

 

       [facility]        Office [id number]        Jorge L           





       [facility]        Visit  1840          [place], /2015         Med

ical



       [facility]                             MD                          Group



       Family                                                         



       Practice                                                         



                                                                      



                                                                      

 

                     Outpatient [id number]        ART             [facility] /                [facility]        Office [id number]        Jorge L           





       [facility]        Visit  3600          [place], /2015         Vinay Jauregui MD                          Group



       Gastroenter                                                         



       ology                                                          



                                                                      



                                                                      

 

       [facility]        Lab Report [id number]        Jorge L       

    



       [place] [facility]               96[address]          [place], /2015         Med

rut Gilbert MD                          Group



       [facility]                                                         



       ology                                                          



                                                                      



                                                                      

 

                     Outpatient [id number]        JORGE L           [facility] 

[facility]



                                          [facility] /                [facility]



                                                                      



                                                                      



                                                                      



                                                                      

 

       [facility]        Bedded [id number]        Jorge L           

 Sugar



       [facility]        Outpatient               [place] /2015         Sukh

d



       [facility]                                                         



                                                                      



                                                                      



                                                                      

 

                     Outpatient [id number]        JORGE L  09/10         [facility] 

[facility]



                                          [facility] /                [facility]



                                                                      



                                                                      



                                                                      



                                                                      

 

                     Outpatient [id number]        JORGE L  09/10         Active 

[facility]



                                          [facility] /                [facility]



                                                                      



                                                                      



                                                                      



                                                                      

 

                     Outpatient [id number]        DYLON          Active 

[facility]



                                          [facility] /                [facility]



                                                                      



                                                                      



                                                                      



                                                                      

 

                     Outpatient [id number]        ART             [facility] 

[facility]



                                          [place] /                [facility]



                                                                      



                                                                      



                                                                      



                                                                      

 

                     Outpatient [id number]        ROMY           [facility] 

[facility]



                                          [place]  /                [facility]



                                                                      



                                                                      



                                                                      



                                                                      

 

                     Outpatient [id number]        DYLON          Active 

[facility]



                                          [facility] /                [facility]



                                                                      



                                                                      



                                                                      



                                                                      

 

                     Outpatient [id number]        ART             [facility] 

[facility]



                                          [place] /                [facility]



                                                                      



                                                                      



                                                                      



                                                                      

 

                     Outpatient [id number]        ART             [facility] 

[facility]



                                          [place] /                [facility]



                                                                      



                                                                      



                                                                      



                                                                      

 

                     Outpatient [id number]        DYLON          [facility] 

[facility]



                                          [facility] /                [facility]



                                                                      



                                                                      



                                                                      



                                                                      

 

                     Outpatient [id number]        ROMY           [facility] 

[facility]



                                          [place]  /                [facility]



                                                                      



                                                                      



                                                                      



                                                                      

 

                     Outpatient [id number]        DYLON          [facility] 

[facility]



                                          [facility] /                [facility]



                                                                      



                                                                      



                                                                      



                                                                      

 

                     Outpatient [id number]        ART             Active 

[facility]



                                          [place] /                [facility]



                                                                      



                                                                      



                                                                      



                                                                      

 

                     Outpatient [id number]        ART             [facility] 

[facility]



                                          [place] /                [facility]



                                                                      



                                                                      



                                                                      



                                                                      

 

                     Outpatient [id number]        [facility]             [facility] 

[facility]



                                          [place] /                [facility]



                                                                      



                                                                      



                                                                      



                                                                      

 

                     Outpatient [id number]        ART             Active 

[facility]



                                          [place] /                [facility]



                                                                      



                                                                      



                                                                      



                                                                      

 

                     Outpatient [id number]        DYLON          [facility] 

[facility]



                                          [facility] /                [facility]



                                                                      



                                                                      



                                                                      



                                                                      

 

                     Outpatient [id number]        DOPPLER          Active

 [facility]



                                          [facility]  /                [facility]



                                                                      



                                                                      



                                                                      



                                                                      

 

                     Outpatient [id number]        NUCLEAR          Active

 [facility]



                                          [facility]  /                [facility]



                                                                      



                                                                      



                                                                      



                                                                      

 

                     Outpatient [id number]        NUCLEAR          Active

 [facility]



                                          [facility]  /                [facility]



                                                                      



                                                                      



                                                                      



                                                                      

 

                     Outpatient [id number]        NUCLEAR          Active

 [facility]



                                          [facility]  /                [facility]



                                                                      



                                                                      



                                                                      



                                                                      

 

                     Outpatient [id number]        NUCLEAR          Active

 [facility]



                                          [facility]  /                [facility]



                                                                      



                                                                      



                                                                      



                                                                      

 

                     Outpatient [id number]        NUCLEAR          Active

 [facility]



                                          [facility]  /                [facility]



                                                                      



                                                                      



                                                                      



                                                                      

 

                     Outpatient [id number]        NUCLEAR          Active

 [facility]



                                          VIS[facility]  /                [facility]          Ambulatory [id number]        NURSE           





       Radiology        Pre-Reg               VISIT  /2017         [facility]

l



       [facility]                                                         Group



                                                                      



                                                                      



                                                                      

 

       [facility]          Outpatient [id number]        NURSE  11/14  11/15         





       Radiology                             VISIT  /2017         Medical



       [facility]                                                         Group



                                                                      



                                                                      



                                                                      

 

       [facility]          Ambulatory [id number]        NURSE           





       Radiology        Pre-Reg               VISIT  /2017         [facility]

l



       [facility]                                                         Group



                                                                      



                                                                      



                                                                      

 

       [facility]          Ambulatory [id number]        NURSE           





       Radiology        Pre-Reg               VISIT  /2017         [facility]

l



       [facility]                                                         [facility]



                                                                      



                                                                      



                                                                      

 

       [facility]          Ambulatory [id number]        NURSE           





       Radiology        Pre-Reg               VISIT  /2017         [facility]

l



       [facility]                                                         [facility]



                                                                      



                                                                      



                                                                      

 

       [facility]          Outpatient [id number]        NURSE  11/14  11/15         





       Radiology                             VISIT  /2017         Medical



       [facility]                                                         [facility]



                                                                      



                                                                      



                                                                      

 

       [facility]          Ambulatory [id number]        NURSE           





       Radiology        Pre-Reg               VISIT  /2017         [facility]

l



       [facility]                                                         [facility]



                                                                      



                                                                      



                                                                      

 

       [facility]          Ambulatory [id number]        NURSE           





       Radiology        Pre-Reg               VISIT  /2017         [facility]

l



       [facility]                                                         [facility]



                                                                      



                                                                      



                                                                      

 

       [facility]          Outside [id number]                        



       Primary        Medical                      /2017         Medical



       Care          Records                                           Group



       [facility]                                                         



                                                                      



                                                                      

 

                     Outpatient [id number]        ERIN          [facility]          Outpatient [id number]        [facility]          





       Pulmonology                             [facility]  /2017         Medic

al



       [facility]                                                         Group



                                                                      



                                                                      



                                                                      

 

                     Outpatient [id number]        JERECIA          Active

 [facility]



                                          [place]                 [facility]          Outpatient [id number]        [facility]         

 



       Primary                             [place] /2017         Medical



       Care                                                           Group



       [facility]          Outpt Diag [id number]        Erin 01/09  01/10         

 [facility]



       Outpatient        Services               [place]  /2018         [place]

r



       [facility]                                                         [place]



       [place]                                                         



                                                                      



                                                                      

 

                     Outpatient [id number]        ERIN          [facility]                [place]



                                                                      



                                                                      



                                                                      



                                                                      

 

                     Outpatient [id number]        ERIN          [facility]  /2018                [facility]



                                                                      



                                                                      



                                                                      



                                                                      

 

       MG          Outpatient [id number]        Erin          





       Pulmonology                             [place]         Medic

al



       [facility]                                                         Group



                                                                      



                                                                      



                                                                      

 

                     Outpatient [id number]        DYLON          Active 

[facility]



                                          ANDREZ                [place]



                                                                      



                                                                      



                                                                      



                                                                      

 

       [facility]          Outpatient [id number]        [facility]          





       Cardiology                             [facility] /2018         Medi

sandra



       [facility]                                                         [facility]



                                                                      



                                                                      



                                                                      

 

       [facility]        Inpatient [id number]        [facility]         

 



       [facility]                             [facility] /2018         [facility]                                                         



                                                                      



                                                                      

 

                     Outpatient [id number]        DYLON          Active 

[facility]



                                          ANDREZ                [place]



                                                                      



                                                                      



                                                                      



                                                                      

 

       MG          Outpatient [id number]        [facility]          





       Cardiology                             Andrez         Medi

sandra



       [facility]                                                         Group



                                                                      



                                                                      



                                                                      

 

                     Outpatient [id number]        ART             Active 

[facility]



                                          [place]                [place]



                                                                      



                                                                      



                                                                      



                                                                      

 

       MG          Outpatient [id number]        Art             





       Primary                             [place] /2018         [facility]

l



       Care                                                           Group



       [facility]                                                         



                                                                      



                                                                      

 

       MG          Phone  [phone]                        



       Primary        Message                               Medical



       Care                                                           Group



       [facility]                                                         



                                                                      



                                                                      

 

                     Outpatient [id number]        DYLON          Active 

[facility]



                                          ANDREZ                [place]



                                                                      



                                                                      



                                                                      



                                                                      

 

       MG          Outpatient [id number]        [facility]          





       Cardiology                             Andrez         Medi

sandra



       [facility]                                                         Group



                                                                      



                                                                      



                                                                      

 

                     Outpatient [id number]        ERIN 05/15         Active 

[facility]



                                                          [place]



                                                                      



                                                                      



                                                                      



                                                                      

 

       MG          Outpatient [id number]        Erin 05/15  05/16         





       Pulmonology                             [place]         Medic

al



       [facility]                                                         [facility]



                                                                      



                                                                      



                                                                      

 

       MG          Phone  [phone]                        



       Primary        Message                               Medical



       Care                                                           Group



       [facility]                                                         



                                                                      



                                                                      

 

                     Outpatient [id number]        JERECIA          Active

 [facility]



                                                          [place]



                                                                      



                                                                      



                                                                      



                                                                      

 

       MG          Outpatient [id number]        [facility]         

 [facility]



       Primary                             [place]          Medical



       Care                                                           Group



       [facility]                                                         



                                                                      



                                                                      

 

       MG          Phone  [phone]                        [facility]



       Primary        Message                      /2018         Medical



       Care                                                           Group



       [place]                                                         



                                                                      



                                                                      

 

                     Outpatient [id number]        JERECIA          Active

 [facility]



                                                          [place]



                                                                      



                                                                      



                                                                      



                                                                      

 

       MG          Outpatient [id number]        Jerecia         

 [facility]



       Primary                             [place] /2018         Medical



       Care                                                           Group



       [facility]                                                         



                                                                      



                                                                      

 

                     Outpatient [id number]        JERECIA 09/10         Active

 [facility]



                                          [place]                 [facility]



                                                                      



                                                                      



                                                                      



                                                                      

 

       [facility]          Outpatient [id number]        Jerecia 09/10  09/11        

 [facility]



       Primary                             [place] /2018         Medical



       Care                                                           Group



       [facility]                                                         



                                                                      



                                                                      

 

                     Outpatient [id number]        DYLON 11/15         Active 

[facility]



                                          [facility]                [facility]



                                                                      



                                                                      



                                                                      



                                                                      

 

       [facility]          Outpatient [id number]        [facility] 11/15  11/16         

[facility]



       Cardiology                             [place] /2018         Medi

sandra



       [facility]                                                         Group



                                                                      



                                                                      



                                                                      

 

       [facility]          Phone  [phone]                        [facility]



       Primary        Message                      /2019         Medical



       Care                                                           Group



       [facility]                                                         



                                                                      



                                                                      

 

                     Outpatient [id number]        JERECIA 01/15         Active

 [facility]



                                          [place]                 [place]



                                                                      



                                                                      



                                                                      



                                                                      

 

       [facility]          Outpatient [id number]        Jerecia 01/15  01/16        

 [facility]



       Primary                             [place] /2019         Medical



       Care                                                           Group



       [facility]                                                         



                                                                      



                                                                      

 

                     Outpatient [id number]        [facility]          Active 

[facility]



                                          [facility]                [facility]



                                                                      



                                                                      



                                                                      



                                                                      

 

                     Outpatient [id number]        Erin          Active 

[facility]



                                          [place]                [facility]



                                                                      



                                                                      



                                                                      



                                                                      

 

                     Outpatient [id number]        NURSE           Active 

[facility]



                                          VISIT  /                [facility]



                                                                      



                                                                      



                                                                      



                                                                      

 

       MG          Outpatient [id number]        [facility]          





       Cardiology                             [place] /2019         Medi

sandra



       [facility]                                                         [facility]



                                                                      



                                                                      



                                                                      

 

       [facility]          Outpatient [id number]        NURSE           

[facility]



       Radiology                             VISIT  /2019         Medical



       [facility]                                                         Group



                                                                      



                                                                      



                                                                      

 

       MG          Outpatient [id number]        Erin          





       Pulmonology                             [place]  /2019         Medic

al



       [facility]                                                         Group



                                                                      



                                                                      



                                                                      

 

                     Outpatient [id number]        [facility]rec[facility]          Active

 [facility]



                                          [place]                 [facility]



                                                                      



                                                                      



                                                                      



                                                                      

 

       MG          Outpatient [id number]        [facility]recia         

 [facility]



       Primary                             [place] /2019         Medical



       Care                                                           Group



       [facility]                                                         



                                                                      



                                                                      

 

       [facility]          Phone  [phone]                        [facility]



       Primary        Message                      /2019         Medical



       Care                                                           Group



       [facility]                                                         



                                                                      



                                                                      

 

       [facility]          Between [id number]                        [facility]



       Primary        Visit                       /2019         Medical



       Care                                                           Group



       [facility]                                                         



                                                                      



                                                                      

 

       [facility]          Phone  [phone]                        [facility]



       Primary        Message                      /2019         Medical



       Care                                                           Group



       [facility]                                                         



                                                                      



                                                                      

 

       [facility]          Phone  [phone]                        [facility]



       Primary        Message                      /2019         Medical



       Care                                                           Group



       [facility]                                                         



                                                                      



                                                                      

 

                     Outpatient [id number]        [facility]recia          Active

 [facility]



                                          [place]                 [place]



                                                                      



                                                                      



                                                                      



                                                                      

 

                     Outpatient [id number]        [facility]          Active 

[facility]



                                          [facility]                [facility]



                                                                      



                                                                      



                                                                      



                                                                      

 

       [facility]          Outpatient [id number]        [facility]recia         

 [facility]



       Primary                             [place] /2019         Medical



       Care                                                           Group



       [facility]                                                         



                                                                      



                                                                      

 

       [facility]          Outpatient [id number]        [facility]          

[facility]



       Cardiology                             And[place] /2019         [facility]                                                         Group



                                                                      



                                                                      



                                                                      

 

       [facility]          Between [id number]               11/14  11/15         [facility]



       Primary        Visit                       /2019         Medical



       Care                                                           Group



       [place]                                                         



                                                                      



                                                                      

 

       [facility]          Between [id number]               11/15  11/16         [facility]



       Primary        Visit                       /2019         Medical



       Care                                                           Group



       [facility]                                                         



                                                                      



                                                                      

 

       [facility]          Phone  [phone]                        [facility]



       Primary        Message                      /2019         Medical



       Care                                                           Group



       [facility]                                                         



                                                                      



                                                                      

 

       [facility]          Phone  [phone]                        [facility]



       Primary        Message                      /2019         Medical



       Care                                                           Group



       [facility]                                                         



                                                                      



                                                                      

 

       [facility]          Phone  [phone]                        [facility]



       Primary        Message                      /2019         Medical



       Care                                                           Group



       [facility]                                                         



                                                                      



                                                                      

 

                     Outpatient [id number]        [facility]           [facility]



                                          [place]                  [place]



                                                                      



                                                                      



                                                                      



                                                                      

 

       [facility]          Outpatient [id number]        Romy           

[facility]



       Primary                             [place]  /2019         Medical



       Care                                                           Group



       [facility]                                                         



                                                                      



                                                                      

 

                     Outpatient [id number]        Jerecia          [facility]

 [facility]



                                          [place]                 [place]



                                                                      



                                                                      



                                                                      



                                                                      

 

                     Outpatient [id number]        NURSE           [facility] 

[facility]



                                          [facility]  /                [place]



                                                                      



                                                                      



                                                                      



                                                                      

 

       [facility]          Outpatient [id number]        Jerecia         

 [facility]



       Primary                             [place] /2020         Medical



       Care                                                           Group



       [facility]                                                         



                                                                      



                                                                      

 

       [facility]          Outpatient [id number]        NURSE           

MH



       Radiology                             VISIT  /2020         Medical



       [place]                                                         Group



                                                                      



                                                                      



                                                                      

 

       [facility]          Between [id number]                        [facility]



       Primary        Visit                       /2020         Medical



       Care                                                           Group



       [place]                                                         



                                                                      



                                                                      

 

       [facility]          Between [id number]                        [facility]



       Primary        Visit                       /2020         Medical



       Care                                                           Group



       [place]                                                         



                                                                      



                                                                      

 

                     Outpatient [id number]        [facility]          [facility] 

[facility]



                                          [place] /                [place]



                                                                      



                                                                      



                                                                      



                                                                      

 

                     Outpatient [id number]        [facility]          [facility] 

[facility]



                                          [place] /2020                [facility]



                                                                      



                                                                      



                                                                      



                                                                      

 

                     Outpatient [id number]        Erin          [facility] 

[facility]



                                          [place]                  [place]



                                                                      



                                                                      



                                                                      



                                                                      

 

       [facility]          Outpatient [id number]        [place]          





       Cardiology                             [place] /2020         Medi

sandra



       [facility]                                                         [facility]



                                                                      



                                                                      



                                                                      

 

       [facility]          Ambulatory [id number]        [place]          





       Cardiology        Pre-Reg               [place] /2020         Med

ical



       [place]                                                         Group



                                                                      



                                                                      



                                                                      

 

       [facility]          Outpatient [id number]        NURSE           





       Pulmonology                             VISIT  /2020         Medic

al



       [facility]                                                         Group



                                                                      



                                                                      



                                                                      

 

                     Outpatient [id number]        Jerecia          Active

 [facility]



                                          [place]                 [facility]          Ambulatory [id number]        Jerecia         

 



       Primary        Pre-Reg               [place] /2020         Medical



       Care                                                           Group



       [place]                                                         



                                                                      



                                                                      

 

                     Outpatient [id number]        Jerecia          Active

 [facility]



                                          [place]                 [facility]          Outpatient [id number]        Jerecia         

 



       Primary                             [place] /2020         Medical



       Care                                                           Group



       [facility]          Between [id number]                        



       Primary        Visit                       /2020         Medical



       Care                                                           Group



       [place]                                                         



                                                                      



                                                                      

 

                     Outpatient [id number]        [place]          [facility]



                                          [place]                [place]



                                                                      



                                                                      



                                                                      



                                                                      

 

                     Outpatient [id number]        Erin          [facility]                [place]



                                                                      



                                                                      



                                                                      



                                                                      







Procedures







           Procedure  Code       Date       Perfomer   Comments   Source



                                                                  



                                                                  

 

           Diabetic retinal [id number]  2019            Dr. Thee Ross [facility]



           eye                                         [facility]



           exam<sup>1</sup>                                             



                                                                  



                                                                  

 

           Destruction (eg, 21026      2019                        Medic

al



           laser surgery,                                             Group



           electrosurgery,                                             



           cryosurgery,                                             



           chemosurgery,                                             



           surgical                                               



           curettement),                                             



           premalignant                                             



           lesions (eg,                                             



           actinic                                                



           keratoses); first                                             



           lesion                                                 



                                                                  

 

           Coronary artery [id number]  2018                        [facility]

l



           stent                                                  Group,[facility]



                                                                  

 

           Plantar    [id number]  2017  Medical



           fasciectomy<sup>1<                                             Group,

 OPID



           /sup>                                                  [place],[facility]



                                                                  

 

           Plantar    [id number]  2017  Medical



           fasciectomy<sup>2<                                             Group



           /sup>                                                  



                                                                  

 

           Colonoscopy<sup>2< [id number]   2015            approx 2015 M

[facility]



           /sup>                                       At [facility], [facility]



                                                                  [place],[facility]



                                                                  

 

           Colonoscopy<sup>3< [id number]   2015            approx 2015 M

[facility]



           /sup>                                       At [facility]



                                                                  



                                                                  

 

           smoking/tobacco 14         2014            yes         [facility]

l



           cessation, patient                                             Group



           education and                                             



           counseling                                             



                                                                  

 

           echocardiogram, 53155      2014            Complete    [facility]

l



           complete                                               Group



                                                                  



                                                                  

 

           Cardiac    [id number]   2014            Pt sPt stated  Medical



           catheterization<magaña                                  he had 3   Group,

[facility]



           p>3, 4</sup>                                  stents done a [place]

d,



                                                       year ago.  [facility]



                                                                  



                                                                  

 

           Cardiac    [id number]   2014            Pt sPt stated  [place]

nd



           catheterization<magaña                                  he had 3   



           p>1, 2</sup>                                  stents done a 



                                                       year ago.  



                                                                  



                                                                  

 

           Cardiac    [id number]   2014            Pt sPt stated  Medical



           catheterization<magaña                                  he had 3   Group



           p>4, 5</sup>                                  stents done a 



                                                       year ago.  



                                                                  



                                                                  

 

           diabetic foot [id number]   2013            yes         Medical



           check                                                  Group



                                                                  



                                                                  

 

           Stent placement [id number]  2013                        [facility]

l



                                                                  Group, OPID



                                                                  [place],[facility],



                                                                  [place]



                                                                  

 

           colonoscopy 92306      2009            Done        Medical



                                                                  Group



                                                                  



                                                                  

 

           Bladder operation [id number]   1999                        Medi

sandra



                                                                  Group, OPID



                                                                  [place],[facility],



                                                                  [place]



                                                                  

 

           Hernia repair [id number]   1980                        Medical



                                                                  Group, OPID



                                                                  [place],[facility],



                                                                  [place]



                                                                  

 

           Procedure<sup>5</s 17912372   1960            eye surgery  Me

dical



           up>                                         for muscle Group, OPID



                                                       imbalance  [place],[facility]



                                                                  

 

           Procedure<sup>6</s [id number]   1960            eye surgery  Me

dical



           up>                                         for muscle Group, OPID



                                                       imbalance  [place],[facility]



                                                                  

 

           Cataract surgery [id number]                                    Medic

al



                                                                  Group, OPID



                                                                  [place],



                                                                  [place]



                                                                  

 

           Procedure<sup>3</s [id number]                         Removed the  [place]

[place]



           up>                                         tumor from 



                                                       bladder. No 



                                                       longer cancer. 



                                                                  



                                                                  

 

           Procedure<sup>7</s [id number]                         Removed the  Me

dical



           up>                                         tumor from Group



                                                       bladder. No 



                                                       longer cancer. 



                                                                  



                                                                  







Assessment and Plan







                                        No Data Provided for This Section







Plan of Care







                                        No Data Provided for This Section







Social History







                    Social History      Date                Source



                                                            

 

                    Social History TypeResponse 2020           [facility]

[place]



                    Alcohol                                 



                    Current, Type Liquor.  Frequency: 1-2 times per month.1, 2  

                   



                    Employment/School                       



                    Status: Retired.  Work/School description: Retired 2018.. 

                    



                    Exercise                                



                    3, 4, 5, 6                              



                    Substance Abuse                         



                    Use: None.7                             



                    Smoking Status                          



                                        Former smoker; Type: Cigarettes; Exposur

e to Tobacco Smoke None; Cigarette 

Smoking Last 365 Days No; Reg Smoking Cessation Counseling No; Started [age] age: 
12.0; Stopped at age: 45; Other Tobacco Frequency quit 19 years ago;            

               



                    entered on: 18                     



                                        1None since [id number] [employer].

  Works in the yard.[id number] regular 

[id number]                                   

 

                    Social History TypeResponse 2015           [facility]



                    Substance Abuse                         



                    Use: None.1                             



                    Exercise                                



                    Exercise type: Walking.2, 3, 4                     



                    Employment/School                       



                    Status: Employed.                       



                    Alcohol                                 



                    Current, Type Liquor.  Frequency: 1-2 times per week.5, 6   

                  



                    Smoking Status                          



                                        Former smoker; Type: Cigarettes; Exposur

e to Tobacco Smoke None; Cigarette 

Smoking Last 365 Days No; Reg Smoking Cessation Counseling No; Started at age: 
12.0; Stopped at age: 45; Other Tobacco Frequency quit 19 years ago;            

               



                    entered on: 3/6/18                      



                    [id number] regular [id number] since [id number] 

                    

 

                    Social History TypeResponse 2015          [facility]



                    Substance Abuse                         



                    Use: None.1                             



                    Exercise                                



                    2, 3, 4, 5                              



                    Employment/School                       



                    Status: Employed.  Work/School description: On medical leave

..                     



                    Alcohol                                 



                    Current, Type Liquor.  Frequency: 1-2 times per week.6, 7   

                  



                    Smoking Status                          



                                        Former smoker; Type: Cigarettes; Exposur

e to Tobacco Smoke None; Cigarette 

Smoking Last 365 Days No; Reg Smoking Cessation Counseling No; Started at age: 
12.0; Stopped at age: 45; Other Tobacco Frequency quit 19 years ago;            

               



                    entered on: 5/15/18                     



                                        [id number] [employer].  Works in the yard

.[id number] regular [id number] since

[id number]                                       

 

                    Social History TypeResponse 2015           [place]

d



                    Substance Abuse                         



                    1                                       



                    Exercise                                



                    2                                       



                    Employment/School                       



                    Status: Employed.                       



                    Alcohol                                 



                    3                                       



                    Smoking Status                          



                                        Never smoker; Exposure to Tobacco Smoke 

None; Cigarette Smoking Last 365 Days 

No; Reg Smoking Cessation Counseling No                           



                    [id number]                         







Family History







                                        No Data Provided for This Section







Advance Directives







                                        No Data Provided for This Section







Functional Status







                                        No Data Provided for This Section

## 2022-01-12 ENCOUNTER — HOSPITAL ENCOUNTER (OUTPATIENT)
Dept: HOSPITAL 97 - OR | Age: 67
Discharge: HOME | End: 2022-01-12
Attending: PODIATRIST
Payer: COMMERCIAL

## 2022-01-12 VITALS — TEMPERATURE: 97.5 F

## 2022-01-12 VITALS — DIASTOLIC BLOOD PRESSURE: 63 MMHG | SYSTOLIC BLOOD PRESSURE: 125 MMHG | OXYGEN SATURATION: 97 %

## 2022-01-12 DIAGNOSIS — Z20.822: ICD-10-CM

## 2022-01-12 DIAGNOSIS — M77.31: Primary | ICD-10-CM

## 2022-01-12 DIAGNOSIS — E78.5: ICD-10-CM

## 2022-01-12 DIAGNOSIS — E11.9: ICD-10-CM

## 2022-01-12 DIAGNOSIS — I10: ICD-10-CM

## 2022-01-12 DIAGNOSIS — I25.10: ICD-10-CM

## 2022-01-12 DIAGNOSIS — J44.9: ICD-10-CM

## 2022-01-12 DIAGNOSIS — M72.2: ICD-10-CM

## 2022-01-12 PROCEDURE — 28119 REMOVAL OF HEEL SPUR: CPT

## 2022-01-12 PROCEDURE — 82947 ASSAY GLUCOSE BLOOD QUANT: CPT

## 2022-01-12 PROCEDURE — 0QBL0ZZ EXCISION OF RIGHT TARSAL, OPEN APPROACH: ICD-10-PCS

## 2022-01-12 PROCEDURE — 76000 FLUOROSCOPY <1 HR PHYS/QHP: CPT

## 2022-01-12 NOTE — RAD REPORT
EXAM DESCRIPTION:  RAD - Fluoroscopy <1 Hour - 1/12/2022 2:40 pm

 

CLINICAL HISTORY:  PLANTAR FASCIECTOMY RIGHT FOOT

 

COMPARISON:  No comparisons

 

FINDINGS/IMPRESSION:  Seven intraoperative fluoroscopic images were submitted showing pre and post pl
brian spur removal.

 

Fluoro time: 0.8 minutes

Dose: 0.84 mGy